# Patient Record
Sex: MALE | Race: WHITE | NOT HISPANIC OR LATINO | Employment: FULL TIME | URBAN - METROPOLITAN AREA
[De-identification: names, ages, dates, MRNs, and addresses within clinical notes are randomized per-mention and may not be internally consistent; named-entity substitution may affect disease eponyms.]

---

## 2022-12-20 NOTE — TELEPHONE ENCOUNTER
Action 2022 JTV 10:45 AM    Action Taken CSS called patient. Patient did not . [facility] LVM for patient to return call.      Action 2022 JTV 4:14 PM    Action Taken Patient returned call and confirmed he will get his [place] records emailed over. Patient also confirmed he has a lab appointment scheduled.      Action 2022 JTV 9:22 AM    Action Taken CSS sent a message to Joslyn to confirm cost center number for translating patient records.     @10:24 AM, [facility] sent a request for patient records to be translated.     Action 2023 JTV 8:09am   Action Taken Clinic Supervisor responded back stating we will not have records translated.         MEDICAL RECORDS REQUEST   [facility]  Urology Clinic  [address] [place] [address]  PHONE: [phone]  Fax: [phone]        FUTURE VISIT INFORMATION                                                   Willi Lares, : 1995 scheduled for future visit at [facility]    APPOINTMENT INFORMATION:    Date: 2023    Provider:  Nahun Hartmann MD    Reason for Visit/Diagnosis: infertility    RECORDS REQUESTED FOR VISIT                                                     NOTES  STATUS/DETAILS   MEDICATION LIST  no   INFERTILITY     FSH  Yes, Yes, scheduled for 2023   LAST UROLOGY/OB GYN VISIT NOTE  Yes, Yes, scheduled for 2023   SEMEN ANALYSIS (LAST 2)  Yes, scheduled for 2023   T  Yes, Yes, scheduled for 2023     PRE-VISIT CHECKLIST      Record collection complete yes   Appointment appropriately scheduled           (right time/right provider) Yes   Joint diagnostic appointment coordinated correctly          (ensure right order & amount of time) Yes   MyChart activation Yes   Questionnaire complete If no, please explain pending

## 2023-01-21 ENCOUNTER — LAB (OUTPATIENT)
Dept: LAB | Facility: CLINIC | Age: 28
End: 2023-01-21
Payer: COMMERCIAL

## 2023-01-21 DIAGNOSIS — Z31.41 FERTILITY TESTING: ICD-10-CM

## 2023-01-21 LAB — FSH SERPL IRP2-ACNC: 2.7 MIU/ML (ref 1.5–12.4)

## 2023-01-21 PROCEDURE — 84403 ASSAY OF TOTAL TESTOSTERONE: CPT

## 2023-01-21 PROCEDURE — 82670 ASSAY OF TOTAL ESTRADIOL: CPT

## 2023-01-21 PROCEDURE — 84270 ASSAY OF SEX HORMONE GLOBUL: CPT

## 2023-01-21 PROCEDURE — 36415 COLL VENOUS BLD VENIPUNCTURE: CPT

## 2023-01-21 PROCEDURE — 83001 ASSAY OF GONADOTROPIN (FSH): CPT

## 2023-01-23 LAB — SHBG SERPL-SCNC: 53 NMOL/L (ref 11–80)

## 2023-01-25 LAB
TESTOST FREE SERPL-MCNC: 6.16 NG/DL
TESTOST SERPL-MCNC: 413 NG/DL (ref 240–950)

## 2023-01-28 LAB — ESTRADIOL SERPL HS-MCNC: 17 PG/ML (ref 10–40)

## 2023-01-29 NOTE — RESULT ENCOUNTER NOTE
Dimas Márquez     Here are your recent test results which are NORMAL.  There are no concerns.      Thank You,    Please let me know if you have any questions!    Anne JOHNSON

## 2023-02-06 ENCOUNTER — MYC MEDICAL ADVICE (OUTPATIENT)
Dept: UROLOGY | Facility: CLINIC | Age: 28
End: 2023-02-06

## 2023-02-06 ENCOUNTER — LAB (OUTPATIENT)
Dept: LAB | Facility: CLINIC | Age: 28
End: 2023-02-06
Payer: COMMERCIAL

## 2023-02-06 DIAGNOSIS — Z31.41 FERTILITY TESTING: Primary | ICD-10-CM

## 2023-02-06 DIAGNOSIS — Z31.41 ENCOUNTER FOR SPERM COUNT FOR FERTILITY TESTING: ICD-10-CM

## 2023-02-06 LAB
ABSTINENCE DAYS: 4 DAYS (ref 2–7)
AGGLUTINATION: ABNORMAL
ANALYSIS TEMP - CENTIGRADE: 23 CENTIGRADE
COLLECTION METHOD: ABNORMAL
COLLECTION SITE: ABNORMAL
CONSENT TO RELEASE TO PARTNER: YES
DAL- RECEIVED TIME: ABNORMAL
IMMOTILE: 0 %
LIQUEFIED: YES
NON-PROGRESSIVE MOTILITY: 0 %
PROGRESSIVE MOTILITY: 0 %
ROUND CELLS: 0 MILLION/ML
SPECIMEN PH: 6 PH
SPECIMEN VOLUME: 1 ML
SPERM CONCENTRATION: 0 MILLION/ML
TIME OF ANALYSIS: ABNORMAL
TOTAL PROGRESSIVE MOTILE NUMBER: 0 MILLION
TOTAL SPERM NUMBER: 0 MILLION
VISCOUS: NO

## 2023-02-06 PROCEDURE — 89310 SEMEN ANALYSIS W/COUNT: CPT

## 2023-02-09 ENCOUNTER — OFFICE VISIT (OUTPATIENT)
Dept: UROLOGY | Facility: CLINIC | Age: 28
End: 2023-02-09
Payer: COMMERCIAL

## 2023-02-09 ENCOUNTER — PRE VISIT (OUTPATIENT)
Dept: UROLOGY | Facility: CLINIC | Age: 28
End: 2023-02-09

## 2023-02-09 VITALS
WEIGHT: 155 LBS | HEIGHT: 72 IN | OXYGEN SATURATION: 97 % | SYSTOLIC BLOOD PRESSURE: 168 MMHG | HEART RATE: 85 BPM | BODY MASS INDEX: 20.99 KG/M2 | DIASTOLIC BLOOD PRESSURE: 74 MMHG

## 2023-02-09 DIAGNOSIS — N46.01 AZOOSPERMIA: Primary | ICD-10-CM

## 2023-02-09 DIAGNOSIS — Z87.438 HISTORY OF UNDESCENDED TESTICLE: ICD-10-CM

## 2023-02-09 DIAGNOSIS — R86.8 LOW VOLUME OF EJACULATED SEMEN: ICD-10-CM

## 2023-02-09 PROCEDURE — 99204 OFFICE O/P NEW MOD 45 MIN: CPT | Performed by: UROLOGY

## 2023-02-09 ASSESSMENT — PAIN SCALES - GENERAL: PAINLEVEL: NO PAIN (0)

## 2023-02-09 NOTE — PROGRESS NOTES
It was my pleasure to see Mr. Willi Lares, a 28 year old male here in consultation today for fertility evaluation.  His spouse is age 29    This couple has been attempting to conceive for the last 2 yrs.  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  They have no previous pregnancy together.  Pregnancies with other partners: none.  They have tried timed intercourse. They have not tried IUI or IVF.    Female factors suspected: None known.  Cycles are fairly irregular.  She is getting seen by OB for this.  PCOS suspected for her.  Took metformin and was able to menstruate.      He has semen analysis results from [place]:    Sept 2020- 6d abstinence, 0.5mL  4 immotile dysmorphic sperms. Elevated WBCs seen.  Oct 2020  0.5mL, 7 sperm seen, no motility, 6/7 dysmorphic.  100K WBC.  Nov 2020 2d abst  0.2mL, 19 sperm, no motility.  Most dysmorphic.  500k WBC.    Semen analysis results [facility] showed azoospermia 2/6/23    Hormone labs [facility] Aug 3, 2020   T 467  FSH 2.17   LH 4.15      Component      Latest Ref Rng & Units 1/21/2023   Free Testosterone Calculated      ng/dL 6.16   Testosterone Total      240 - 950 ng/dL 413   Estradiol Ultrasensitive      10 - 40 pg/mL 17   FSH      1.5 - 12.4 mIU/mL 2.7   Sex Hormone Binding Globulin      11 - 80 nmol/L 53     UA [place]- post ejac urine did not show sperm.      PAST MEDICAL HISTORY / PAST SURG HISTORY  History of twin birth, he was small twin.   Hernia at birth ? Inguinal hernia   History of undescended testis not sure which side.  ? Right       Medications as of 2/9/2023:  No prescription medications   No history of testosterone replacement therapy.    ALLERGY:   No Known Allergies    SOCIAL HISTORY:  . Occupation: .  No alcohol abuse, no tobacco use.   Social History     Tobacco Use     Smoking status: Never     Smokeless tobacco: Never   No marijuana      REVIEW OF SYSTEMS:  Significant for  feeling well at present .    Denies erectile dysfunction, ejaculatory problems, testicular pain. No vision or smell deficits, no chronic sinus or respiratory infections. No recent febrile illness, weight loss. No heat or cold intolerance, gynecomastia, or other endocrine complaints.    Otherwise, no constitutional, eye, ENT, heart, lung, GI , musculoskeletal, skin, neurologic, psychiatric, or hematologic complaints.    GONADOTOXIN EXPOSURE: Unremarkable. Otherwise negative for marijuana, heat, chemicals, pesticides, heavy metals, steroids, chemotherapy or radiation.    GENERAL PHYSICAL EXAM  BP (!) 168/74 (BP Location: Right arm, Patient Position: Sitting, Cuff Size: Adult Regular)   Pulse 85   Ht 1.829 m (6')   Wt 70.3 kg (155 lb)   SpO2 97%   BMI 21.02 kg/m       Constitutional: No acute distress. Well nourished.   PSYCH: normal mood and affect.  NEURO: normal gait, no focal deficits.   EYES: anicteric, EOMI, PERR  CARDIOPULMONARY: breathing non-labored, pulse regular, no peripheral edema.  GI: Abdomen soft, non-tender, no obvious surgical scars, no organomegaly.  MUSCULOSKELETAL: normal limb proportions, no muscle wasting, no contractures.  SKIN: Normal virilized hair distribution, no lesions, warts or rashes over genitalia, abdomen extremities or face.  HEME/LYMPH: no ecchymosis, no lymphadenopathy in groin, no lymphedema.     EXAM:  Phallus circumcised, meatus adequate, no plaques palpated.   Left testis descended , size is 18 cc , consistency is normal . No intra-testicular masses.   Right testis descended , size is 18 cc , consistency is normal . No intra-testicular masses.   Epididymes present, non-tender, not enlarged.   Left cord: Vas present. No varicocele noted.  Right cord: Vas present. No varicocele noted.     Rectal exam deferred.     Labs/imaging reviewed by me today: see HPI    ASSESSMENT:    Fertility Testing.    Testicular hypofunction - azoospermia.  Unclear if obstruction or  non-obstructive azoospermia     No varicocele noted    Low volume acidic pH semen.  Consistent with absent or obstructed SV's  Vasa are both palpable.    History of undescended testis, not sure what side.      PLAN:    Hormonal panels reviewed - all normal.    Semen analyses x 4 reviewed. Currently shows azoospermia.    Recommended pelvic MRI or trans-rectal ultrasound to evaluate for ejaculatory duct obstruction and presence of SV's.  He needs to arrange insurance to consider imaging.    obtain genetic screen with karyotype and Y-chromosome microdeletion, and would definitely consider CF screening also.    financial liaison to contact.    Thank-you for allowing me to care for your patient.  Sincerely,    Nahun Hartmann MD        Additional Coding Information:    Problems:  4 -- one undiagnosed new problem with uncertain prognosis    Data Reviewed  3 or more studies reviewed, as listed above     Tests ordered/pending: karyotype, Y-chromosome microdeletion, CF screen, pelvic MRI.    Notes from other providers reviewed: N/A     Level of risk:  3 -- low risk (e.g., OTC medication or observation, minor surgery without risks)    Time spent:  40 minutes spent on the date of the encounter doing chart review, history and exam, documentation and further activities per the note

## 2023-02-09 NOTE — NURSING NOTE
Chief Complaint   Patient presents with     Consult     Infertility       Blood pressure (!) 168/74, pulse 85, height 1.829 m (6'), weight 70.3 kg (155 lb), SpO2 97 %. Body mass index is 21.02 kg/m .    There is no problem list on file for this patient.      No Known Allergies    No current outpatient medications on file.       Social History     Tobacco Use     Smoking status: Never     Smokeless tobacco: Never       Lloyd Yao, EMT  2/9/2023  10:03 AM

## 2023-02-09 NOTE — LETTER
2/9/2023       RE: Willi Lares  [address] [facility] [address]     Dear Colleague,    Thank you for referring your patient, Willi Lares, to the [facility]OLOGY [facility] [place] at [facility]. Please see a copy of my visit note below.    It was my pleasure to see . Willi Lares, a 28 year old male here in consultation today for fertility evaluation.  His spouse is age 29    This couple has been attempting to conceive for the last 2 yrs.  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  They have no previous pregnancy together.  Pregnancies with other partners: none.  They have tried timed intercourse. They have not tried IUI or IVF.    Female factors suspected: None known.  Cycles are fairly irregular.  She is getting seen by OB for this.  PCOS suspected for her.  Took metformin and was able to menstruate.      He has semen analysis results from [place]:    Sept 2020- 6d abstinence, 0.5mL  4 immotile dysmorphic sperms. Elevated WBCs seen.  Oct 2020  0.5mL, 7 sperm seen, no motility, 6/7 dysmorphic.  100K WBC.  Nov 2020 2d abst  0.2mL, 19 sperm, no motility.  Most dysmorphic.  500k WBC.    Semen analysis results [facility] showed azoospermia 2/6/23    Hormone labs [facility] Aug 3, 2020   T 467  FSH 2.17   LH 4.15      Component      Latest Ref Rng & Units 1/21/2023   Free Testosterone Calculated      ng/dL 6.16   Testosterone Total      240 - 950 ng/dL 413   Estradiol Ultrasensitive      10 - 40 pg/mL 17   FSH      1.5 - 12.4 mIU/mL 2.7   Sex Hormone Binding Globulin      11 - 80 nmol/L 53     UA [place]- post ejac urine did not show sperm.      PAST MEDICAL HISTORY / PAST SURG HISTORY  History of twin birth, he was small twin.   Hernia at birth ? Inguinal hernia   History of undescended testis not sure which side.  ? Right       Medications as of 2/9/2023:  No prescription medications   No  history of testosterone replacement therapy.    ALLERGY:   No Known Allergies    SOCIAL HISTORY:  . Occupation: lab tech.  No alcohol abuse, no tobacco use.   Social History     Tobacco Use     Smoking status: Never     Smokeless tobacco: Never   No marijuana      REVIEW OF SYSTEMS:  Significant for feeling well at present .    Denies erectile dysfunction, ejaculatory problems, testicular pain. No vision or smell deficits, no chronic sinus or respiratory infections. No recent febrile illness, weight loss. No heat or cold intolerance, gynecomastia, or other endocrine complaints.    Otherwise, no constitutional, eye, ENT, heart, lung, GI , musculoskeletal, skin, neurologic, psychiatric, or hematologic complaints.    GONADOTOXIN EXPOSURE: Unremarkable. Otherwise negative for marijuana, heat, chemicals, pesticides, heavy metals, steroids, chemotherapy or radiation.    GENERAL PHYSICAL EXAM  BP (!) 168/74 (BP Location: Right arm, Patient Position: Sitting, Cuff Size: Adult Regular)   Pulse 85   Ht 1.829 m (6')   Wt 70.3 kg (155 lb)   SpO2 97%   BMI 21.02 kg/m       Constitutional: No acute distress. Well nourished.   PSYCH: normal mood and affect.  NEURO: normal gait, no focal deficits.   EYES: anicteric, EOMI, PERR  CARDIOPULMONARY: breathing non-labored, pulse regular, no peripheral edema.  GI: Abdomen soft, non-tender, no obvious surgical scars, no organomegaly.  MUSCULOSKELETAL: normal limb proportions, no muscle wasting, no contractures.  SKIN: Normal virilized hair distribution, no lesions, warts or rashes over genitalia, abdomen extremities or face.  HEME/LYMPH: no ecchymosis, no lymphadenopathy in groin, no lymphedema.     EXAM:  Phallus circumcised, meatus adequate, no plaques palpated.   Left testis descended , size is 18 cc , consistency is normal . No intra-testicular masses.   Right testis descended , size is 18 cc , consistency is normal . No intra-testicular masses.   Epididymes present,  non-tender, not enlarged.   Left cord: Vas present. No varicocele noted.  Right cord: Vas present. No varicocele noted.     Rectal exam deferred.     Labs/imaging reviewed by me today: see HPI    ASSESSMENT:    Fertility Testing.    Testicular hypofunction - azoospermia.  Unclear if obstruction or non-obstructive azoospermia     No varicocele noted    Low volume acidic pH semen.  Consistent with absent or obstructed SV's  Vasa are both palpable.    History of undescended testis, not sure what side.      PLAN:    Hormonal panels reviewed - all normal.    Semen analyses x 4 reviewed. Currently shows azoospermia.    Recommended pelvic MRI or trans-rectal ultrasound to evaluate for ejaculatory duct obstruction and presence of SV's.  He needs to arrange insurance to consider imaging.    obtain genetic screen with karyotype and Y-chromosome microdeletion, and would definitely consider CF screening also.    financial liaison to contact.    Thank-you for allowing me to care for your patient.  Sincerely,    Nahun Hartmann MD        Additional Coding Information:    Problems:  4 -- one undiagnosed new problem with uncertain prognosis    Data Reviewed  3 or more studies reviewed, as listed above     Tests ordered/pending: karyotype, Y-chromosome microdeletion, CF screen, pelvic MRI.    Notes from other providers reviewed: N/A     Level of risk:  3 -- low risk (e.g., OTC medication or observation, minor surgery without risks)    Time spent:  40 minutes spent on the date of the encounter doing chart review, history and exam, documentation and further activities per the note               Again, thank you for allowing me to participate in the care of your patient.      Sincerely,    Nahun Hartmann MD

## 2023-02-09 NOTE — LETTER
Date:February 10, 2023      Provider requested that no letter be sent. Do not send.       [facility]

## 2023-02-12 ENCOUNTER — HEALTH MAINTENANCE LETTER (OUTPATIENT)
Age: 28
End: 2023-02-12

## 2024-03-10 ENCOUNTER — HEALTH MAINTENANCE LETTER (OUTPATIENT)
Age: 29
End: 2024-03-10

## 2024-07-24 ENCOUNTER — TRANSFERRED RECORDS (OUTPATIENT)
Dept: HEALTH INFORMATION MANAGEMENT | Facility: CLINIC | Age: 29
End: 2024-07-24

## 2024-07-24 ENCOUNTER — LAB (OUTPATIENT)
Dept: LAB | Facility: CLINIC | Age: 29
End: 2024-07-24
Payer: COMMERCIAL

## 2024-07-24 DIAGNOSIS — N46.01 AZOOSPERMIA: Primary | ICD-10-CM

## 2024-07-24 DIAGNOSIS — R86.8 LOW VOLUME OF EJACULATED SEMEN: ICD-10-CM

## 2024-07-24 DIAGNOSIS — Z87.438 HISTORY OF UNDESCENDED TESTICLE: ICD-10-CM

## 2024-07-24 PROCEDURE — 99000 SPECIMEN HANDLING OFFICE-LAB: CPT

## 2024-07-24 PROCEDURE — 36415 COLL VENOUS BLD VENIPUNCTURE: CPT

## 2024-08-05 LAB — SCANNED LAB RESULT: NORMAL

## 2024-08-06 NOTE — RESULT ENCOUNTER NOTE
Dear Jerzy,     Here are your recent results.     Your Y-chromosome microdeletion test was normal.  I will contact you when I see the karyotype results, those are still pending.    Please let us know if you have any questions or concerns.     Anne JOHNSON

## 2024-08-09 ENCOUNTER — HOSPITAL ENCOUNTER (OUTPATIENT)
Dept: MRI IMAGING | Facility: CLINIC | Age: 29
Discharge: HOME | End: 2024-08-09
Attending: UROLOGY | Admitting: UROLOGY
Payer: COMMERCIAL

## 2024-08-09 DIAGNOSIS — N46.01 AZOOSPERMIA: ICD-10-CM

## 2024-08-09 DIAGNOSIS — R86.8 LOW VOLUME OF EJACULATED SEMEN: ICD-10-CM

## 2024-08-09 DIAGNOSIS — Z87.438 HISTORY OF UNDESCENDED TESTICLE: ICD-10-CM

## 2024-08-09 PROCEDURE — 72195 MRI PELVIS W/O DYE: CPT

## 2024-08-13 LAB — SCANNED LAB RESULT: NORMAL

## 2024-08-14 NOTE — RESULT ENCOUNTER NOTE
Dear Jerzy,     Here are your recent results.     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  This is potentially fixable with surgery.  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    Options are looking like either a surgery to try to open the sperm duct blockage in the prostate, or the other option is likely going to be surgical sperm acquisition and IVF.       I am optimistic that unblocking the ejaculatory duct obstruction would be successful for you.  It's nice to find an anatomic cause for low sperm count, these are not super common to find.  (Although, there is still a chance that surgery would not restore sperm to the semen.)    I do see you had a normal karyotype result with Sandeep Hernandez PA-c on 7/22/24.    Please let us know if you have any questions.  I am happy to see you back to discuss surgical options, please call to schedule a follow-up appointment [phone].    Anne JOHNSON

## 2024-08-16 ENCOUNTER — PRE VISIT (OUTPATIENT)
Dept: UROLOGY | Facility: CLINIC | Age: 29
End: 2024-08-16
Payer: COMMERCIAL

## 2024-08-16 NOTE — TELEPHONE ENCOUNTER
Reason for visit: infertility     Relevant information: Azoospermia, hx of undescended testis, low volume of ejaculated semen    Records/imaging/labs/orders: all records available    Pt called: no need for a call    At Rooming:  Standard rooming      Gage Claros  8/16/2024  12:24 PM

## 2024-09-26 ENCOUNTER — APPOINTMENT (OUTPATIENT)
Dept: UROLOGY | Facility: CLINIC | Age: 29
End: 2024-09-26
Payer: COMMERCIAL

## 2024-09-26 ENCOUNTER — OFFICE VISIT (OUTPATIENT)
Dept: UROLOGY | Facility: CLINIC | Age: 29
End: 2024-09-26
Payer: COMMERCIAL

## 2024-09-26 VITALS
HEART RATE: 72 BPM | DIASTOLIC BLOOD PRESSURE: 89 MMHG | BODY MASS INDEX: 21.4 KG/M2 | WEIGHT: 158 LBS | SYSTOLIC BLOOD PRESSURE: 135 MMHG | HEIGHT: 72 IN | OXYGEN SATURATION: 96 %

## 2024-09-26 DIAGNOSIS — N46.01 AZOOSPERMIA: Primary | ICD-10-CM

## 2024-09-26 DIAGNOSIS — Q55.4: ICD-10-CM

## 2024-09-26 DIAGNOSIS — Z31.84 ENCOUNTER FOR FERTILITY PRESERVATION PROCEDURE: ICD-10-CM

## 2024-09-26 PROCEDURE — 99215 OFFICE O/P EST HI 40 MIN: CPT | Performed by: UROLOGY

## 2024-09-26 PROCEDURE — 99207 PR NO CHARGE NURSE ONLY: CPT

## 2024-09-26 RX ORDER — CEFAZOLIN SODIUM 2 G/50ML
2 SOLUTION INTRAVENOUS
OUTPATIENT
Start: 2024-09-26

## 2024-09-26 RX ORDER — CEFAZOLIN SODIUM 2 G/50ML
2 SOLUTION INTRAVENOUS SEE ADMIN INSTRUCTIONS
OUTPATIENT
Start: 2024-09-26

## 2024-09-26 RX ORDER — ACETAMINOPHEN 325 MG/1
975 TABLET ORAL ONCE
OUTPATIENT
Start: 2024-09-26 | End: 2024-09-26

## 2024-09-26 RX ORDER — ACETAMINOPHEN 650 MG/1
650 SUPPOSITORY RECTAL ONCE
OUTPATIENT
Start: 2024-09-26

## 2024-09-26 ASSESSMENT — PAIN SCALES - GENERAL: PAINLEVEL: NO PAIN (0)

## 2024-09-26 NOTE — LETTER
9/26/2024       RE: Willi Lares  [address] [facility] [address]     Dear Colleague,    Thank you for referring your patient, Willi Lares, to the [facility] [place] at [facility]. Please see a copy of my visit note below.    HPI:  Willi Lares is a 29 year old male being seen for follow-up infertility     He was last seen 2/9/2023.  Since that time he moved out of [facility] for a year, and now is back in [place] and wanting to pursue fertility workup.    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    He has several semen analyses with low volume ejaculate.    He has normal Y chromosome and karyotype testing.    Negative CF testing  Post ejaculate urine did not show any sperm  Prostate MRI shows bilateral dilated SV's, with the right seminal vesicle dilated down into the prostate.    History of undescended testis, he is not certain which side.      Exam:  /89   Pulse 72   Ht 1.829 m (6')   Wt 71.7 kg (158 lb)   SpO2 96%   BMI 21.43 kg/m      General: Alert, oriented, nad  Eyes: anicteric, EOMI.  Pulse: regular  Resps: normal, non-labored.  Abdomen:  nondistended.   exam deferred.       Review of imaging and Labs:    He has semen analysis results from [place]:     Sept 2020- 6d abstinence, 0.5mL  4 immotile dysmorphic sperms. Elevated WBCs seen.  Oct 2020  0.5mL, 7 sperm seen, no motility, 6/7 dysmorphic.  100K WBC.  Nov 2020 2d abst  0.2mL, 19 sperm, no motility.  Most dysmorphic.  500k WBC.     Semen analysis results [facility] showed azoospermia 2/6/23     Hormone labs [facility] Aug 3, 2020   T 467  FSH 2.17   LH 4.15        Component      Latest Ref Rng & Units 1/21/2023   Free Testosterone Calculated      ng/dL 6.16   Testosterone Total      240 - 950 ng/dL 413   Estradiol Ultrasensitive      10 - 40 pg/mL 17    FSH      1.5 - 12.4 mIU/mL 2.7   Sex Hormone Binding Globulin      11 - 80 nmol/L 53      UA [place]- post ejac urine did not show sperm.      Karyotype 46,XY 7/22/24  Y-chromosome microdeletion test normal 7/22/24  CF testing negative 7/22/24 including 5T negative.    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 The etiology of azoospermia appears obstructive.         ASSESSMENT:  Fertility Testing.  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    Normal karyotype and Y-chromosome microdeletion tests.  CF screen was negative.  No varicocele noted  Low volume acidic pH semen.  Consistent with absent or obstructed SV's  Vasa are both palpable.  History of undescended testis, not sure what side.  Prostate MRI suggests ejaculatory duct obstruction.  Hormonal panels reviewed - all normal.  Semen analyses x 4 reviewed. Currently shows azoospermia.    We discussed that all signs point towards obstructive azoospermia.  He does have a history of undescended testicle which puts him at increased risk for decree spermatogenesis, but his FSH is not elevated, and all signs point more so towards obstruction.  I discussed with him that it is relatively rare to find apparent ejaculatory duct obstruction causing low volume ejaculate, but that definitely does appear to be his case.    We discussed that a good next step would be to set up a procedure under anesthesia.  This would involve transrectal ultrasound, injection of methylene blue or preferably indigo carmine into the seminal vesicles via ultrasound guidance, and performing a transurethral resection of the ejaculatory ducts.  I discussed that opening the ejaculatory ducts not return sperm to the semen.  If this is a longstanding congenital obstruction as it appears, he may have secondary obstruction(s) in  bilateral epididymis.  In the past, his semen analysis has shown scant sperm in the semen, so at least 1 side is in continuity, previously.      We discussed the option of testicle sperm extraction while under anesthesia, he would like to pursue this.  I discussed with him that any sperm we get directly from the testicle would necessitate in-vitro fertilization to use it for fertility.  He will think about it, but he is pretty sure he would like to have surgical sperm acquisition while under anesthesia.     PLAN:  Schedule surgery for: TURED, AMILCAR possible TESE, trans-rectal ultrasound procedure.  ID labs today.  AMILCAR/TESE cryopreservation order for sperm cryo      Nahun Hartmann MD  [facility] [place]    ==========================  Additional Coding Information:    Problems:  3 -- one stable chronic illness    Data Reviewed  3 or more studies reviewed, as listed above     Tests ordered: 6 labs ordered     Level of risk:  3 -- low risk (e.g., OTC medication or observation, minor surgery without risks)    Time spent:  44 minutes spent on the date of the encounter doing chart review, history and exam, documentation and further activities as noted above              Again, thank you for allowing me to participate in the care of your patient.      Sincerely,    Nahun Hartmann MD

## 2024-09-26 NOTE — PROGRESS NOTES
HPI:  Willi Lares is a 29 year old male being seen for follow-up infertility     He was last seen 2/9/2023.  Since that time he moved out of [facility] for a year, and now is back in [place] and wanting to pursue fertility workup.    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    He has several semen analyses with low volume ejaculate.    He has normal Y chromosome and karyotype testing.    Negative CF testing  Post ejaculate urine did not show any sperm  Prostate MRI shows bilateral dilated SV's, with the right seminal vesicle dilated down into the prostate.    History of undescended testis, he is not certain which side.      Exam:  /89   Pulse 72   Ht 1.829 m (6')   Wt 71.7 kg (158 lb)   SpO2 96%   BMI 21.43 kg/m      General: Alert, oriented, nad  Eyes: anicteric, EOMI.  Pulse: regular  Resps: normal, non-labored.  Abdomen:  nondistended.   exam deferred.       Review of imaging and Labs:    He has semen analysis results from [facility]:     Sept 2020- 6d abstinence, 0.5mL  4 immotile dysmorphic sperms. Elevated WBCs seen.  Oct 2020  0.5mL, 7 sperm seen, no motility, 6/7 dysmorphic.  100K WBC.  Nov 2020 2d abst  0.2mL, 19 sperm, no motility.  Most dysmorphic.  500k WBC.     Semen analysis results [facility] showed azoospermia 2/6/23     Hormone labs [facility] Aug 3, 2020   T 467  FSH 2.17   LH 4.15        Component      Latest Ref Rng & Units 1/21/2023   Free Testosterone Calculated      ng/dL 6.16   Testosterone Total      240 - 950 ng/dL 413   Estradiol Ultrasensitive      10 - 40 pg/mL 17   FSH      1.5 - 12.4 mIU/mL 2.7   Sex Hormone Binding Globulin      11 - 80 nmol/L 53      UA [facility]- post ejac urine did not show sperm.      Karyotype 46,XY 7/22/24  Y-chromosome microdeletion test normal 7/22/24  CF testing negative 7/22/24 including 5T negative.    Prostate MRI done 8/9/24 suggests  ejaculatory duct obstruction  IMPRESSION: Distention of the bilateral seminal vesicles with a  transition at a midline prostatic cyst, utricle vs. mullerian duct  cyst. The etiology of azoospermia appears obstructive.         ASSESSMENT:  Fertility Testing.  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    Normal karyotype and Y-chromosome microdeletion tests.  CF screen was negative.  No varicocele noted  Low volume acidic pH semen.  Consistent with absent or obstructed SV's  Vasa are both palpable.  History of undescended testis, not sure what side.  Prostate MRI suggests ejaculatory duct obstruction.  Hormonal panels reviewed - all normal.  Semen analyses x 4 reviewed. Currently shows azoospermia.    We discussed that all signs point towards obstructive azoospermia.  He does have a history of undescended testicle which puts him at increased risk for decree spermatogenesis, but his FSH is not elevated, and all signs point more so towards obstruction.  I discussed with him that it is relatively rare to find apparent ejaculatory duct obstruction causing low volume ejaculate, but that definitely does appear to be his case.    We discussed that a good next step would be to set up a procedure under anesthesia.  This would involve transrectal ultrasound, injection of methylene blue or preferably indigo carmine into the seminal vesicles via ultrasound guidance, and performing a transurethral resection of the ejaculatory ducts.  I discussed that opening the ejaculatory ducts not return sperm to the semen.  If this is a longstanding congenital obstruction as it appears, he may have secondary obstruction(s) in bilateral epididymis.  In the past, his semen analysis has shown scant sperm in the semen, so at least 1 side is in continuity, previously.      We discussed the option of testicle sperm extraction while under anesthesia, he would like to pursue this.  I discussed with him that any sperm we get directly  from the testicle would necessitate in-vitro fertilization to use it for fertility.  He will think about it, but he is pretty sure he would like to have surgical sperm acquisition while under anesthesia.     PLAN:  Schedule surgery for: TURED, AMILCAR possible TESE, trans-rectal ultrasound procedure.  ID labs today.  AMILCAR/TESE cryopreservation order for sperm cryo      Nahun Hartmann MD  [facility] [place]    ==========================  Additional Coding Information:    Problems:  3 -- one stable chronic illness    Data Reviewed  3 or more studies reviewed, as listed above     Tests ordered: 6 labs ordered     Level of risk:  3 -- low risk (e.g., OTC medication or observation, minor surgery without risks)    Time spent:  44 minutes spent on the date of the encounter doing chart review, history and exam, documentation and further activities as noted above

## 2024-09-26 NOTE — NURSING NOTE
Chief Complaint   Patient presents with    Consult     Pt states is here for follow up as well as next steps for infertility       Blood pressure 135/89, pulse 72, height 1.829 m (6'), weight 71.7 kg (158 lb), SpO2 96%. Body mass index is 21.43 kg/m .    There is no problem list on file for this patient.      No Known Allergies    No current outpatient medications on file.       Social History     Tobacco Use    Smoking status: Never    Smokeless tobacco: Rolly Claros  9/26/2024  2:47 PM

## 2024-09-30 ENCOUNTER — TELEPHONE (OUTPATIENT)
Dept: UROLOGY | Facility: CLINIC | Age: 29
End: 2024-09-30
Payer: COMMERCIAL

## 2024-09-30 NOTE — TELEPHONE ENCOUNTER
Called and LVM for patient to schedule surgery with Dr. Hartmann. Provided direct call back number [phone].      Daly Fiore on 9/30/2024 at 9:49 AM

## 2024-10-01 NOTE — TELEPHONE ENCOUNTER
Called patient and offered next opening 1/3/25. Patient states he was told we could get him in before the end of the year as his deductible was met.     Message sent to clinic to see if they would like to cancel a clinic to operate on a Friday at [facility].    Patient understands writer will call back once we have an update from the clinic.    Daly Fiore on 10/1/2024 at 4:27 PM

## 2024-10-03 PROBLEM — Q55.4: Status: ACTIVE | Noted: 2024-09-26

## 2024-10-03 PROBLEM — N46.01 AZOOSPERMIA: Status: ACTIVE | Noted: 2024-09-26

## 2024-10-03 NOTE — TELEPHONE ENCOUNTER
Called patient to schedule surgery with Dr. Hartmann    Spoke with:  Patient    Date of Surgery: 11/15/24    Arrival time Discussed with Patient:  Yes but advised it may change    Location of surgery: [facility]     Pre-Op H&P: With PCP Dr. Hernandez within 30 days of the surgery date    Post-Op Appts: 12/5/24 at 1:20 PM      Discussed with patient pre-op RN will call 2-3 days prior to surgery with arrival time and instructions:  Yes     Packet sent out: Yes  via [employer] Message [DATE] 10/3/24    Additional Comments:  Patient does not want to do the genetic testing of his sperm and would like that removed from the procedure. Will route to provider.      All patients questions were answered and patient was instructed to review surgical packet and call back with any questions or concerns.       Daly Fiore on 10/3/2024 at 3:55 PM

## 2024-10-04 ENCOUNTER — PREP FOR PROCEDURE (OUTPATIENT)
Dept: UROLOGY | Facility: CLINIC | Age: 29
End: 2024-10-04
Payer: COMMERCIAL

## 2024-10-23 NOTE — PROGRESS NOTES
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   Teaching Topic:  Pre and post op teaching  Person Involved in teaching: Yes    Motivation Level:  Asks Questions: Yes  Eager to Learn: Yes  Cooperative: Yes  Receptive (willing/able to accept information):  Yes    Patient demonstrates understanding of the following:  Date of surgery:  will  call  Location of surgery:  [facility] - 5th Floor  History and Physical and any other testing necessary prior to surgery: Yes  Required time line for completion of History and Physical and any pre-op testing: Yes    Patient demonstrates understanding of the following:  Pre-op bowel prep:  N/A  Pre-op showering/scrub information with PCMX Soap: Yes  Blood thinner medications discussed and when to stop (if applicable):  N/A  Discussed no visitor's at this time due to increase Covid-19 cases and how we need to make sure everyone stays safe.    Infection Prevention:   Patient demonstrates understanding of the following:  Surgical procedure site care taught: Yes  Signs and symptoms of infection taught: Yes      Post-op follow-up:  Discussed how to contact the hospital, nurse, and clinic scheduling staff if necessary. (See packet information)    Instructional materials used/given/mailed:  [facility] Packet, post op teaching sheet, Map, Soap, and with the arrival/location information to come closer to the surgery date.    Surgical instructions packet given to patient in office:  N/A    Follow up: Discussed arranging for someone to drive you home. ( No public transportation)  Someone needed  to stay the first twenty hours after surgery: Yes     referral: not needed     home:  spouse    Care Giver:  spouse    PCP:  Sandeep Carranza, RN, BSN, PHN  Care Coordinator Urology  [facility]  Urology [facility]  [phone]

## 2024-10-30 LAB — SCANNED LAB RESULT: NORMAL

## 2024-11-05 ENCOUNTER — PRE VISIT (OUTPATIENT)
Dept: UROLOGY | Facility: CLINIC | Age: 29
End: 2024-11-05
Payer: COMMERCIAL

## 2024-11-05 NOTE — TELEPHONE ENCOUNTER
Reason for visit: Post op (DOS 11/15)     Relevant information: Cystourethroscopy done on 11/15/24    Records/imaging/labs/orders: All records available    At Rooming:  Standard rooming      Gage Claros  11/5/2024  4:30 PM

## 2024-11-14 ENCOUNTER — ANESTHESIA EVENT (OUTPATIENT)
Dept: SURGERY | Facility: AMBULATORY SURGERY CENTER | Age: 29
End: 2024-11-14
Payer: COMMERCIAL

## 2024-11-15 ENCOUNTER — ANESTHESIA (OUTPATIENT)
Dept: SURGERY | Facility: AMBULATORY SURGERY CENTER | Age: 29
End: 2024-11-15
Payer: COMMERCIAL

## 2024-11-15 ENCOUNTER — HOSPITAL ENCOUNTER (OUTPATIENT)
Facility: AMBULATORY SURGERY CENTER | Age: 29
Discharge: HOME | End: 2024-11-15
Attending: UROLOGY
Payer: COMMERCIAL

## 2024-11-15 VITALS
TEMPERATURE: 97 F | OXYGEN SATURATION: 98 % | HEART RATE: 61 BPM | SYSTOLIC BLOOD PRESSURE: 108 MMHG | BODY MASS INDEX: 21.4 KG/M2 | RESPIRATION RATE: 14 BRPM | HEIGHT: 72 IN | DIASTOLIC BLOOD PRESSURE: 71 MMHG | WEIGHT: 158 LBS

## 2024-11-15 DIAGNOSIS — Q55.4: ICD-10-CM

## 2024-11-15 DIAGNOSIS — Z31.84 ENCOUNTER FOR FERTILITY PRESERVATION PROCEDURE: ICD-10-CM

## 2024-11-15 DIAGNOSIS — N46.01 AZOOSPERMIA: ICD-10-CM

## 2024-11-15 PROCEDURE — 88305 TISSUE EXAM BY PATHOLOGIST: CPT | Mod: TC | Performed by: UROLOGY

## 2024-11-15 PROCEDURE — 88305 TISSUE EXAM BY PATHOLOGIST: CPT | Mod: 26 | Performed by: PATHOLOGY

## 2024-11-15 RX ORDER — PROPOFOL 10 MG/ML
INJECTION, EMULSION INTRAVENOUS PRN
Status: DISCONTINUED | OUTPATIENT
Start: 2024-11-15 | End: 2024-11-15

## 2024-11-15 RX ORDER — LIDOCAINE HYDROCHLORIDE 20 MG/ML
INJECTION, SOLUTION INFILTRATION; PERINEURAL PRN
Status: DISCONTINUED | OUTPATIENT
Start: 2024-11-15 | End: 2024-11-15

## 2024-11-15 RX ORDER — ACETAMINOPHEN 650 MG/1
650 SUPPOSITORY RECTAL ONCE
Status: COMPLETED | OUTPATIENT
Start: 2024-11-15 | End: 2024-11-15

## 2024-11-15 RX ORDER — ONDANSETRON 2 MG/ML
4 INJECTION INTRAMUSCULAR; INTRAVENOUS EVERY 30 MIN PRN
Status: DISCONTINUED | OUTPATIENT
Start: 2024-11-15 | End: 2024-11-15 | Stop reason: HOSPADM

## 2024-11-15 RX ORDER — CEFAZOLIN SODIUM 2 G/50ML
2 SOLUTION INTRAVENOUS
Status: COMPLETED | OUTPATIENT
Start: 2024-11-15 | End: 2024-11-15

## 2024-11-15 RX ORDER — OXYCODONE HYDROCHLORIDE 5 MG/1
5 TABLET ORAL
Status: DISCONTINUED | OUTPATIENT
Start: 2024-11-15 | End: 2024-11-16 | Stop reason: HOSPADM

## 2024-11-15 RX ORDER — DEXAMETHASONE SODIUM PHOSPHATE 4 MG/ML
INJECTION, SOLUTION INTRA-ARTICULAR; INTRALESIONAL; INTRAMUSCULAR; INTRAVENOUS; SOFT TISSUE PRN
Status: DISCONTINUED | OUTPATIENT
Start: 2024-11-15 | End: 2024-11-15

## 2024-11-15 RX ORDER — ONDANSETRON 2 MG/ML
4 INJECTION INTRAMUSCULAR; INTRAVENOUS EVERY 30 MIN PRN
Status: DISCONTINUED | OUTPATIENT
Start: 2024-11-15 | End: 2024-11-16 | Stop reason: HOSPADM

## 2024-11-15 RX ORDER — FENTANYL CITRATE 50 UG/ML
25 INJECTION, SOLUTION INTRAMUSCULAR; INTRAVENOUS EVERY 5 MIN PRN
Status: DISCONTINUED | OUTPATIENT
Start: 2024-11-15 | End: 2024-11-15 | Stop reason: HOSPADM

## 2024-11-15 RX ORDER — FENTANYL CITRATE 50 UG/ML
INJECTION, SOLUTION INTRAMUSCULAR; INTRAVENOUS PRN
Status: DISCONTINUED | OUTPATIENT
Start: 2024-11-15 | End: 2024-11-15

## 2024-11-15 RX ORDER — OXYCODONE HYDROCHLORIDE 5 MG/1
10 TABLET ORAL
Status: DISCONTINUED | OUTPATIENT
Start: 2024-11-15 | End: 2024-11-16 | Stop reason: HOSPADM

## 2024-11-15 RX ORDER — KETOROLAC TROMETHAMINE 30 MG/ML
15 INJECTION, SOLUTION INTRAMUSCULAR; INTRAVENOUS
Status: DISCONTINUED | OUTPATIENT
Start: 2024-11-15 | End: 2024-11-15 | Stop reason: HOSPADM

## 2024-11-15 RX ORDER — CEFAZOLIN SODIUM 2 G/50ML
2 SOLUTION INTRAVENOUS SEE ADMIN INSTRUCTIONS
Status: DISCONTINUED | OUTPATIENT
Start: 2024-11-15 | End: 2024-11-15 | Stop reason: HOSPADM

## 2024-11-15 RX ORDER — HYDROMORPHONE HYDROCHLORIDE 1 MG/ML
0.2 INJECTION, SOLUTION INTRAMUSCULAR; INTRAVENOUS; SUBCUTANEOUS EVERY 5 MIN PRN
Status: DISCONTINUED | OUTPATIENT
Start: 2024-11-15 | End: 2024-11-15 | Stop reason: HOSPADM

## 2024-11-15 RX ORDER — ONDANSETRON 4 MG/1
4 TABLET, ORALLY DISINTEGRATING ORAL EVERY 30 MIN PRN
Status: DISCONTINUED | OUTPATIENT
Start: 2024-11-15 | End: 2024-11-16 | Stop reason: HOSPADM

## 2024-11-15 RX ORDER — SODIUM CHLORIDE, SODIUM LACTATE, POTASSIUM CHLORIDE, CALCIUM CHLORIDE 600; 310; 30; 20 MG/100ML; MG/100ML; MG/100ML; MG/100ML
INJECTION, SOLUTION INTRAVENOUS CONTINUOUS PRN
Status: DISCONTINUED | OUTPATIENT
Start: 2024-11-15 | End: 2024-11-15

## 2024-11-15 RX ORDER — ACETAMINOPHEN 325 MG/1
975 TABLET ORAL ONCE
Status: COMPLETED | OUTPATIENT
Start: 2024-11-15 | End: 2024-11-15

## 2024-11-15 RX ORDER — HYDROMORPHONE HYDROCHLORIDE 1 MG/ML
0.4 INJECTION, SOLUTION INTRAMUSCULAR; INTRAVENOUS; SUBCUTANEOUS EVERY 5 MIN PRN
Status: DISCONTINUED | OUTPATIENT
Start: 2024-11-15 | End: 2024-11-15 | Stop reason: HOSPADM

## 2024-11-15 RX ORDER — ONDANSETRON 4 MG/1
4 TABLET, ORALLY DISINTEGRATING ORAL EVERY 30 MIN PRN
Status: DISCONTINUED | OUTPATIENT
Start: 2024-11-15 | End: 2024-11-15 | Stop reason: HOSPADM

## 2024-11-15 RX ORDER — NALOXONE HYDROCHLORIDE 0.4 MG/ML
0.1 INJECTION, SOLUTION INTRAMUSCULAR; INTRAVENOUS; SUBCUTANEOUS
Status: DISCONTINUED | OUTPATIENT
Start: 2024-11-15 | End: 2024-11-15 | Stop reason: HOSPADM

## 2024-11-15 RX ORDER — ONDANSETRON 2 MG/ML
INJECTION INTRAMUSCULAR; INTRAVENOUS PRN
Status: DISCONTINUED | OUTPATIENT
Start: 2024-11-15 | End: 2024-11-15

## 2024-11-15 RX ORDER — DEXAMETHASONE SODIUM PHOSPHATE 10 MG/ML
4 INJECTION, SOLUTION INTRAMUSCULAR; INTRAVENOUS
Status: DISCONTINUED | OUTPATIENT
Start: 2024-11-15 | End: 2024-11-16 | Stop reason: HOSPADM

## 2024-11-15 RX ORDER — KETOROLAC TROMETHAMINE 30 MG/ML
INJECTION, SOLUTION INTRAMUSCULAR; INTRAVENOUS PRN
Status: DISCONTINUED | OUTPATIENT
Start: 2024-11-15 | End: 2024-11-15

## 2024-11-15 RX ORDER — FENTANYL CITRATE 50 UG/ML
50 INJECTION, SOLUTION INTRAMUSCULAR; INTRAVENOUS EVERY 5 MIN PRN
Status: DISCONTINUED | OUTPATIENT
Start: 2024-11-15 | End: 2024-11-15 | Stop reason: HOSPADM

## 2024-11-15 RX ORDER — DIMENHYDRINATE 50 MG/ML
25 INJECTION, SOLUTION INTRAMUSCULAR; INTRAVENOUS
Status: DISCONTINUED | OUTPATIENT
Start: 2024-11-15 | End: 2024-11-15 | Stop reason: HOSPADM

## 2024-11-15 RX ORDER — NALOXONE HYDROCHLORIDE 0.4 MG/ML
0.1 INJECTION, SOLUTION INTRAMUSCULAR; INTRAVENOUS; SUBCUTANEOUS
Status: DISCONTINUED | OUTPATIENT
Start: 2024-11-15 | End: 2024-11-16 | Stop reason: HOSPADM

## 2024-11-15 RX ORDER — PROPOFOL 10 MG/ML
INJECTION, EMULSION INTRAVENOUS CONTINUOUS PRN
Status: DISCONTINUED | OUTPATIENT
Start: 2024-11-15 | End: 2024-11-15

## 2024-11-15 RX ORDER — DEXAMETHASONE SODIUM PHOSPHATE 10 MG/ML
4 INJECTION, SOLUTION INTRAMUSCULAR; INTRAVENOUS
Status: DISCONTINUED | OUTPATIENT
Start: 2024-11-15 | End: 2024-11-15 | Stop reason: HOSPADM

## 2024-11-15 RX ORDER — LIDOCAINE 40 MG/G
CREAM TOPICAL
Status: DISCONTINUED | OUTPATIENT
Start: 2024-11-15 | End: 2024-11-15 | Stop reason: HOSPADM

## 2024-11-15 RX ADMIN — CEFAZOLIN SODIUM 2 G: 2 SOLUTION INTRAVENOUS at 07:26

## 2024-11-15 RX ADMIN — ONDANSETRON 4 MG: 2 INJECTION INTRAMUSCULAR; INTRAVENOUS at 07:46

## 2024-11-15 RX ADMIN — LIDOCAINE HYDROCHLORIDE 80 MG: 20 INJECTION, SOLUTION INFILTRATION; PERINEURAL at 07:32

## 2024-11-15 RX ADMIN — FENTANYL CITRATE 50 MCG: 50 INJECTION, SOLUTION INTRAMUSCULAR; INTRAVENOUS at 07:51

## 2024-11-15 RX ADMIN — PROPOFOL 200 MG: 10 INJECTION, EMULSION INTRAVENOUS at 07:34

## 2024-11-15 RX ADMIN — PROPOFOL 150 MCG/KG/MIN: 10 INJECTION, EMULSION INTRAVENOUS at 07:35

## 2024-11-15 RX ADMIN — FENTANYL CITRATE 50 MCG: 50 INJECTION, SOLUTION INTRAMUSCULAR; INTRAVENOUS at 07:32

## 2024-11-15 RX ADMIN — KETOROLAC TROMETHAMINE 30 MG: 30 INJECTION, SOLUTION INTRAMUSCULAR; INTRAVENOUS at 08:26

## 2024-11-15 RX ADMIN — DEXAMETHASONE SODIUM PHOSPHATE 4 MG: 4 INJECTION, SOLUTION INTRA-ARTICULAR; INTRALESIONAL; INTRAMUSCULAR; INTRAVENOUS; SOFT TISSUE at 07:46

## 2024-11-15 RX ADMIN — SODIUM CHLORIDE, SODIUM LACTATE, POTASSIUM CHLORIDE, CALCIUM CHLORIDE: 600; 310; 30; 20 INJECTION, SOLUTION INTRAVENOUS at 07:29

## 2024-11-15 ASSESSMENT — LIFESTYLE VARIABLES: TOBACCO_USE: 0

## 2024-11-15 NOTE — PROGRESS NOTES
Pt declines TESE portion of procedure today.  Consent adjusted to remove the testicular sperm acquisition portion.

## 2024-11-15 NOTE — ANESTHESIA CARE TRANSFER NOTE
Patient: Willi Lares    Procedure: Procedure(s):  CYSTOURETHROSCOPY, WITH TRANSURETHRAL RESECTION OF  EJACULATORY DUCTS BILATERAL  transrectal ultrasound, inject seminal vesicle with methylene blue.       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    Anesthesia Type:   General     Note:    Oropharynx: spontaneously breathing and oropharynx clear of all foreign objects  Level of Consciousness: awake  Oxygen Supplementation: face mask  Level of Supplemental Oxygen (L/min / FiO2): 6  Independent Airway: airway patency satisfactory and stable  Dentition: dentition unchanged  Vital Signs Stable: post-procedure vital signs reviewed and stable  Report to RN Given: handoff report given  Patient transferred to: PACU  Comments: Resps easy and regular. Report to PACU RN  Handoff Report: Identifed the Patient, Identified the Reponsible Provider, Reviewed the pertinent medical history, Discussed the surgical course, Reviewed Intra-OP anesthesia mangement and issues during anesthesia, Set expectations for post-procedure period and Allowed opportunity for questions and acknowledgement of understanding    Vitals:  Vitals Value Taken Time   /64 11/15/24 0845   Temp 36.9  C (98.5  F) 11/15/24 0832   Pulse 68 11/15/24 0850   Resp 9 11/15/24 0850   SpO2 98 % 11/15/24 0850   Vitals shown include unfiled device data.    Electronically Signed By: NANCY LE CRNA  November 15, 2024  8:51 AM

## 2024-11-15 NOTE — OP NOTE
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]    Post-operative diagnosis:  Same as pre-operative diagnosis     Procedure:   1) CYSTOURETHROSCOPY, WITH TRANSURETHRAL RESECTION OF  EJACULATORY DUCTS - bilateral   2) TRANSRECTAL ULTRASOUND, INJECTION OF RIGHT SEMINAL VESICLE WITH METHYLENE BLUE    Surgeon:              * Nahun Hartmann MD - Primary  Resident: Sabra Isidro MD   Anesthesia:     General             Estimated Blood Loss: 2 ml      Drains: None     Specimens:          ID Type Source Tests Collected by Time Destination   1 : Prostate chips Tissue Prostate SURGICAL PATHOLOGY EXAM Nahun Hartmann MD 11/15/2024  8:18 AM        Findings: Right ejaculatory duct cyst      Complications:            None.    INDICATIONS FOR PROCEDURE: Willi Lares is a 29 year old male who was seen in consultation for infertility. Work-up showed suspected obstructive azospermia and pelvic MRI showed congenital atresia of the ejaculatory duct(s).  After discussion of the risks, benefits and alternatives of the procedure, the patient agreed to proceed with the above procedure.  We originally discussed testicular sperm acquisition to be done during the same procedure, but he opted out of this procedure and declined AMILCAR/TESE today.    DESCRIPTION OF PROCEDURE: After verifying informed consent, the patient was taken to the operating room. Anesthesia was induced and he was placed in dorsal lithotomy position; he was prepped and draped in standard sterile fashion. A timeout was performed to verify correct patient and procedure. Perioperative antibiotics were in place before the procedure commenced. We began the procedure by inserting transrectal ultrasound probe to identify the ejaculatory ducts and cyst.     The trans-rectal ultrasound probe was inserted and the prostate examined with biplanar ultrasound.  Capsule: Distinct   SVs:  right dilated  into mid prostate, 1cm dimeter of intraprostatic right ejaculatory duct obstruction.  Left ejaculatory duct obstruction not identified.  Left seminal vesicle did not appear to be fluid density, appeared decompressed, not full of fluid density as the right was.   Calcifications: None  The right vas deferens and ejaculatory duct cyst were injected with 2 ml of dilute methylene blue.     We then inserted the 26F resectoscope and survey of the bladder demonstrated bilateral ureteral orifices in orthotopic position, no tumors, suspicious mucosal change, diverticula, or bladder stones were noted.  The meatus was noted to be very mildly hypospadic in appearance but not in position (thinner ventral mucosa).  The bladder contained some blue dye we had injected, and a pinpoint blue dot was noted within the prostatic urethra at the right verumontanum ( consistent with ejaculatory duct).       A 26-Ugandan monopolar resectoscope was used to resect the verumontanum.  Great care was taken to preserve the sphincter.  No resection was taken past the apex of the veru. The right ejaculatory duct was immediately identified just proximal to the veru after one tiny bit of resection- blue dye visible. The site of resection was guided by simultaneous trans-rectal ultrasound.  One more small resection proximal to this site widely unroofed the blue-containing right ejaculatory duct, which was widely patent into the prostatic urethra, consistent with the dilated right ejaculatory duct obstruction seen on prostate MRI. The opening to the cyst appeared widely patent after resection. After emptying the bladder, we sparingly cauterized the resected area as necessary to ensure excellent hemostasis.     The pt was awakened from anesthesia and brought to the PACU in stable condition.   The small bit of prostate tissue was sent as a specimen.    POSTOPERATIVE PLAN:   - Semen analysis and follow-up with Dr. Hartmann in 4 weeks.       I was present and  scrubbed for the entire procedure.  Nahun Hartmann MD  Urology Staff

## 2024-11-15 NOTE — BRIEF OP NOTE
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]  Post-operative diagnosis Same as pre-operative diagnosis    Procedure: CYSTOURETHROSCOPY, WITH TRANSURETHRAL RESECTION OF  EJACULATORY DUCTS BILATERAL, Bilateral - Urethra  transrectal ultrasound, inject seminal vesicle with methylene blue., N/A - Rectum    Surgeon: Surgeons and Role:     * Nahun Hartmann MD - Primary  Resident: Sabra Isidro MD   Anesthesia: General   Estimated Blood Loss: 2 ml     Drains: None    Specimens:     ID Type Source Tests Collected by Time Destination   1 : Prostate chips Tissue Prostate SURGICAL PATHOLOGY EXAM Nahun Hartmann MD 11/15/2024  8:18 AM      Findings: Right ejaculatory duct cyst     Complications: None.

## 2024-11-15 NOTE — ANESTHESIA PROCEDURE NOTES
Airway       Patient location during procedure: OR  Staff -        CRNA: Joslyn Davison APRN CRNA       Performed By: CRNAIndications and Patient Condition       Indications for airway management: umair-procedural       Induction type:intravenous       Mask difficulty assessment: 1 - vent by mask    Final Airway Details       Final airway type: supraglottic airway    Supraglottic Airway Details        Type: LMA       Brand: LMA Unique       LMA size: 5    Post intubation assessment        Placement verified by: capnometry and chest rise        Number of attempts at approach: 1       Number of other approaches attempted: 0       Secured with: tape       Ease of procedure: easy       Dentition: Intact and Unchanged

## 2024-11-15 NOTE — ANESTHESIA PREPROCEDURE EVALUATION
"Anesthesia Pre-Procedure Evaluation    Patient: Willi Lares   MRN: [id number] : 1995        Procedure : Procedure(s):  CYSTOURETHROSCOPY, WITH TRANSURETHRAL RESECTION OF  EJACULATORY DUCTS BILATERAL  cosmetic testis sperm aspiration, transrectal ultrasound, inject seminal vesicle with indigo carmine or methylene blue.          No past medical history on file.   History reviewed. No pertinent surgical history.   No Known Allergies   Social History     Tobacco Use    Smoking status: Never    Smokeless tobacco: Never   Substance Use Topics    Alcohol use: Not on file      Wt Readings from Last 1 Encounters:   11/15/24 71.7 kg (158 lb)        Anesthesia Evaluation   Pt has not had prior anesthetic         ROS/MED HX  ENT/Pulmonary:  - neg pulmonary ROS  (-) tobacco use, asthma, sleep apnea and REUBEN risk factors   Neurologic:       Cardiovascular:       METS/Exercise Tolerance: >4 METS    Hematologic:  - neg hematologic  ROS     Musculoskeletal:  - neg musculoskeletal ROS     GI/Hepatic:  - neg GI/hepatic ROS  (-) GERD   Renal/Genitourinary: Comment: Atresia of ejaculatory duct      Endo:  - neg endo ROS     Psychiatric/Substance Use:       Infectious Disease:  - neg infectious disease ROS     Malignancy:       Other:            Physical Exam    Airway        Mallampati: II   TM distance: > 3 FB   Neck ROM: full   Mouth opening: > 3 cm    Respiratory Devices and Support         Dental       (+) Completely normal teeth      Cardiovascular   cardiovascular exam normal          Pulmonary   pulmonary exam normal                OUTSIDE LABS:  CBC: No results found for: \"WBC\", \"HGB\", \"HCT\", \"PLT\"  BMP: No results found for: \"NA\", \"POTASSIUM\", \"CHLORIDE\", \"CO2\", \"BUN\", \"CR\", \"GLC\"  COAGS: No results found for: \"PTT\", \"INR\", \"FIBR\"  POC: No results found for: \"BGM\", \"HCG\", \"HCGS\"  HEPATIC: No results found for: \"ALBUMIN\", \"PROTTOTAL\", \"ALT\", \"AST\", \"GGT\", \"ALKPHOS\", \"BILITOTAL\", \"BILIDIRECT\", \"VIVIENNE\"  OTHER: No results " "found for: \"PH\", \"LACT\", \"A1C\", \"LIN\", \"PHOS\", \"MAG\", \"LIPASE\", \"AMYLASE\", \"TSH\", \"T4\", \"T3\", \"CRP\", \"SED\"    Anesthesia Plan    ASA Status:  1       Anesthesia Type: General.     - Airway: LMA   Induction: Intravenous.   Maintenance: Balanced.        Consents    Anesthesia Plan(s) and associated risks, benefits, and realistic alternatives discussed. Questions answered and patient/representative(s) expressed understanding.     - Discussed: Risks, Benefits and Alternatives for BOTH SEDATION and the PROCEDURE were discussed     - Discussed with:  Patient            Postoperative Care    Pain management: IV analgesics, Oral pain medications, Multi-modal analgesia.   PONV prophylaxis: Ondansetron (or other 5HT-3)     Comments:               Cathi Mcgee MD    I have reviewed the pertinent notes and labs in the chart from the past 30 days and (re)examined the patient.  Any updates or changes from those notes are reflected in this note.                                   "

## 2024-11-15 NOTE — DISCHARGE INSTRUCTIONS
[facility]  Home Care Following Anesthesia  For 24 hours after surgery:  Get plenty of rest.  A responsible adult must stay with you for at least 24 hours after you leave the surgery center.  Do not drive or use heavy equipment.  If you have weakness or tingling, don't drive or use heavy equipment until this feeling goes away.   Do not drink alcohol.   Avoid strenuous or risky activities.  Ask for help when climbing stairs.  You may feel lightheaded.  IF so, sit for a few minutes before standing.  Have someone help you get up.   If you have nausea (feel sick to your stomach): Drink only clear liquids such as apple juice, ginger ale, broth or 7-Up.  Rest may also help.  Be sure to drink enough fluids.  Move to a regular diet as you feel able.   You may have a slight fever.  Call the doctor if your fever is over 100 F (37.7 C) (taken under the tongue) or lasts longer than 24 hours.  You may have a dry mouth, a sore throat, muscle aches or trouble sleeping. These should go away after 24 hours.  Do not make important or legal decisions.   It is recommended to avoid smoking.               Tips for taking pain medications  To get the best pain relief possible, remember these points:  Take pain medications as directed, before pain becomes severe.  Pain medication can upset your stomach: taking it with food may help.  Constipation is a common side effect of pain medication. Drink plenty of  fluids.  Eat foods high in fiber. Take a stool softener if recommended by your doctor or pharmacist.  Do not drink alcohol, drive or operate machinery while taking pain medications.  Ask about other ways to control pain, such as with heat, ice or relaxation.  Toradol 30 mg given at  0830.  Do not take any NSAIDs for 6 hours.  OK after 2:30pm.  (Ibuprofen, Advil, Motrin, Naproxen, Aleve).      Tylenol/Acetaminophen Consumption    If you feel your pain relief is insufficient, you may take  Tylenol/Acetaminophen in addition to your narcotic pain medication.   Be careful not to exceed 4,000 mg of Tylenol/Acetaminophen in a 24 hour period from all sources.  If you are taking extra strength Tylenol/acetaminophen (500 mg), the maximum dose is 8 tablets in 24 hours.  If you are taking regular strength acetaminophen (325 mg), the maximum dose is 12 tablets in 24 hours.    Call a doctor for any of the following:  Signs of infection (fever, growing tenderness at the surgery site, a large amount of drainage or bleeding, severe pain, foul-smelling drainage, redness, swelling).  It has been over 8 to 10 hours since surgery and you are still not able to urinate (pass water).  Headache for over 24 hours.  Signs of Covid-19 infection (temperature over 100 degrees, shortness of breath, cough, loss of taste/smell, generalized body aches, persistent headache, chills, sore throat, nausea/vomiting/diarrhea)  Your doctor is:  Dr. Nahun Hartmann, Prostate and Urology: [phone]                    Or dial [phone] and ask for the resident on call for:  Prostate Urology  For emergency care, call the:  [place] [facility]:  [phone] (TTY for hearing impaired: [phone])

## 2024-11-15 NOTE — ANESTHESIA POSTPROCEDURE EVALUATION
Patient: Willi Lares    Procedure: Procedure(s):  CYSTOURETHROSCOPY, WITH TRANSURETHRAL RESECTION OF  EJACULATORY DUCTS BILATERAL  transrectal ultrasound, inject seminal vesicle with methylene blue.       Anesthesia Type:  General    Note:  Disposition: Outpatient   Postop Pain Control: Uneventful            Sign Out: Well controlled pain   PONV: No   Neuro/Psych: Uneventful            Sign Out: Acceptable/Baseline neuro status   Airway/Respiratory: Uneventful            Sign Out: Acceptable/Baseline resp. status   CV/Hemodynamics: Uneventful            Sign Out: Acceptable CV status; No obvious hypovolemia; No obvious fluid overload   Other NRE: NONE   DID A NON-ROUTINE EVENT OCCUR? No           Last vitals:  Vitals Value Taken Time   /64 11/15/24 0845   Temp 36.5  C (97.7  F) 11/15/24 0900   Pulse 65 11/15/24 0900   Resp 14 11/15/24 0900   SpO2 95 % 11/15/24 0900   Vitals shown include unfiled device data.    Electronically Signed By: Cathi Mcgee MD  November 15, 2024  11:36 AM   none

## 2024-11-19 LAB
PATH REPORT.COMMENTS IMP SPEC: NORMAL
PATH REPORT.COMMENTS IMP SPEC: NORMAL
PATH REPORT.FINAL DX SPEC: NORMAL
PATH REPORT.GROSS SPEC: NORMAL
PATH REPORT.MICROSCOPIC SPEC OTHER STN: NORMAL
PATH REPORT.RELEVANT HX SPEC: NORMAL
PHOTO IMAGE: NORMAL

## 2024-11-20 NOTE — RESULT ENCOUNTER NOTE
Dear Jerzy,     Here are your recent results.     The tiny bit of prostate tissue resected was benign, as suspected.    Please let us know if you have any questions or concerns.    Thank You!  Anne JOHNSON

## 2024-11-27 ENCOUNTER — PRE VISIT (OUTPATIENT)
Dept: UROLOGY | Facility: CLINIC | Age: 29
End: 2024-11-27
Payer: COMMERCIAL

## 2024-11-27 NOTE — TELEPHONE ENCOUNTER
Reason for visit: Post op     Relevant information: cystourethroscopy with transurethral resection of ejaculatory ducts     Records/imaging/labs/orders: All records available    At Rooming:  Standard rooming      Gage Claros  11/27/2024  12:09 PM

## 2024-12-10 ENCOUNTER — LAB (OUTPATIENT)
Dept: LAB | Facility: CLINIC | Age: 29
End: 2024-12-10
Payer: COMMERCIAL

## 2024-12-10 DIAGNOSIS — N46.01 AZOOSPERMIA: ICD-10-CM

## 2024-12-10 LAB
SEMEN ANALYSIS P VAS PNL: NORMAL
SPERM MOTILE SMN QL MICRO: NORMAL

## 2024-12-10 PROCEDURE — 89321 SEMEN ANAL SPERM DETECTION: CPT

## 2025-01-07 ENCOUNTER — LAB (OUTPATIENT)
Dept: LAB | Facility: CLINIC | Age: 30
End: 2025-01-07
Payer: COMMERCIAL

## 2025-01-07 DIAGNOSIS — R86.8 LOW VOLUME OF EJACULATED SEMEN: ICD-10-CM

## 2025-01-07 DIAGNOSIS — Z87.438 HISTORY OF UNDESCENDED TESTICLE: ICD-10-CM

## 2025-01-07 DIAGNOSIS — Q55.4: Primary | ICD-10-CM

## 2025-01-07 LAB
ABSTINENCE DAYS: 6 DAYS (ref 2–7)
AGGLUTINATION: NO
ANALYSIS TEMP - CENTIGRADE: 21 CENTIGRADE
COLLECTION METHOD: ABNORMAL
COLLECTION SITE: ABNORMAL
CONSENT TO RELEASE TO PARTNER: YES
DAL- RECEIVED TIME: ABNORMAL
IMMOTILE: 0 %
LIQUEFIED: YES
NON-PROGRESSIVE MOTILITY: 0 %
PROGRESSIVE MOTILITY: 0 %
ROUND CELLS: 0 MILLION/ML
SPECIMEN PH: 6 PH
SPECIMEN VOLUME: 1 ML
SPERM CONCENTRATION: 0 MILLION/ML
TIME OF ANALYSIS: ABNORMAL
TOTAL PROGRESSIVE MOTILE NUMBER: 0 MILLION
TOTAL SPERM NUMBER: 0 MILLION
VISCOUS: NO

## 2025-01-07 PROCEDURE — 89260 SPERM ISOLATION SIMPLE: CPT

## 2025-01-11 NOTE — RESULT ENCOUNTER NOTE
Dear Jerzy,     Here are your recent results.     Your recent semen analysis from this week just shows scant sperm.  I was hoping for a more normal sperm count!    I think the ejaculatory duct obstruction has been relieved, as evidenced by you noting a significantly increased semen volume.  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  A testicle biopsy would confirm this (if there is normal sperm production in the testicle, but little/no sperm in the semen, this confirms obstruction).  The other possibility is there truly is decreased sperm production happening in the testicles.  I think this case is less likely, however.    We have a few options going forward:     1. It is very reasonable to continue observation.  There are some sperm in the semen.  The best case scenario is perhaps with the prostate obstruction relieved, the epididymis will start to drain better and we may see an increased number of live sperm in the semen.    2. If the semen continues to show very low numbers of sperm, a testicle biopsy would be the next step.  This would be combined with sperm freezing, if you are IVF candidates.  If this testicle biopsy does confirm only low-level sperm production, then the option for fertility would be to freeze sperm and pursue in vitro fertilization.      3. However, if the testicle biopsy showed perfectly normal sperm production, IVF would still be an option, but a second option would be to perform exploratory surgery of the scrotum to see if the obstructed sites in the epididymis can be identified and bypassed with a microscopic surgery called vasoepididymostomy.  I would estimate this option would have at best 50-50 chance of returning better sperm counts into the semen, however.    Surgical sperm acquisition and  in-vitro fertilization are expensive (and may not be covered by health insurance), but this plan would very likely be the quickest route to a pregnancy.    Let me know if you would like to set up an appointment to discuss options in depth.    Please let us know if you have any questions,    Thank You,      Anne JOHNSON

## 2025-03-08 ENCOUNTER — APPOINTMENT (OUTPATIENT)
Dept: CT IMAGING | Facility: CLINIC | Age: 30
End: 2025-03-08
Attending: EMERGENCY MEDICINE
Payer: COMMERCIAL

## 2025-03-08 ENCOUNTER — HOSPITAL ENCOUNTER (INPATIENT)
Facility: CLINIC | Age: 30
End: 2025-03-08
Attending: EMERGENCY MEDICINE | Admitting: INTERNAL MEDICINE
Payer: COMMERCIAL

## 2025-03-08 ENCOUNTER — APPOINTMENT (OUTPATIENT)
Dept: ULTRASOUND IMAGING | Facility: CLINIC | Age: 30
End: 2025-03-08
Attending: EMERGENCY MEDICINE
Payer: COMMERCIAL

## 2025-03-08 DIAGNOSIS — K74.60 CIRRHOSIS OF LIVER WITH ASCITES, UNSPECIFIED HEPATIC CIRRHOSIS TYPE (H): ICD-10-CM

## 2025-03-08 DIAGNOSIS — K92.2 UPPER GI BLEED: ICD-10-CM

## 2025-03-08 DIAGNOSIS — R18.8 CIRRHOSIS OF LIVER WITH ASCITES, UNSPECIFIED HEPATIC CIRRHOSIS TYPE (H): ICD-10-CM

## 2025-03-08 LAB
ABO + RH BLD: NORMAL
ALBUMIN SERPL BCG-MCNC: 4 G/DL (ref 3.5–5.2)
ALP SERPL-CCNC: 47 U/L (ref 40–150)
ALT SERPL W P-5'-P-CCNC: 35 U/L (ref 0–70)
ANION GAP SERPL CALCULATED.3IONS-SCNC: 9 MMOL/L (ref 7–15)
AST SERPL W P-5'-P-CCNC: 32 U/L (ref 0–45)
BASOPHILS # BLD AUTO: 0 10E3/UL (ref 0–0.2)
BASOPHILS NFR BLD AUTO: 0 %
BILIRUB SERPL-MCNC: 1 MG/DL
BLD GP AB SCN SERPL QL: NEGATIVE
BUN SERPL-MCNC: 39.5 MG/DL (ref 6–20)
CALCIUM SERPL-MCNC: 8.3 MG/DL (ref 8.8–10.4)
CHLORIDE SERPL-SCNC: 107 MMOL/L (ref 98–107)
CREAT SERPL-MCNC: 0.74 MG/DL (ref 0.67–1.17)
EGFRCR SERPLBLD CKD-EPI 2021: >90 ML/MIN/1.73M2
EOSINOPHIL # BLD AUTO: 0.1 10E3/UL (ref 0–0.7)
EOSINOPHIL NFR BLD AUTO: 1 %
ERYTHROCYTE [DISTWIDTH] IN BLOOD BY AUTOMATED COUNT: 13.5 % (ref 10–15)
GLUCOSE SERPL-MCNC: 150 MG/DL (ref 70–99)
HCO3 SERPL-SCNC: 24 MMOL/L (ref 22–29)
HCT VFR BLD AUTO: 30.9 % (ref 40–53)
HCV AB SERPL QL IA: NONREACTIVE
HGB BLD-MCNC: 10.3 G/DL (ref 13.3–17.7)
HGB BLD-MCNC: 9.8 G/DL (ref 13.3–17.7)
HOLD SPECIMEN: NORMAL
IMM GRANULOCYTES # BLD: 0 10E3/UL
IMM GRANULOCYTES NFR BLD: 0 %
INR PPP: 1.26 (ref 0.85–1.15)
LYMPHOCYTES # BLD AUTO: 2.3 10E3/UL (ref 0.8–5.3)
LYMPHOCYTES NFR BLD AUTO: 25 %
MCH RBC QN AUTO: 29.9 PG (ref 26.5–33)
MCHC RBC AUTO-ENTMCNC: 33.3 G/DL (ref 31.5–36.5)
MCV RBC AUTO: 90 FL (ref 78–100)
MONOCYTES # BLD AUTO: 0.6 10E3/UL (ref 0–1.3)
MONOCYTES NFR BLD AUTO: 6 %
NEUTROPHILS # BLD AUTO: 6 10E3/UL (ref 1.6–8.3)
NEUTROPHILS NFR BLD AUTO: 67 %
NRBC # BLD AUTO: 0 10E3/UL
NRBC BLD AUTO-RTO: 0 /100
PLATELET # BLD AUTO: 151 10E3/UL (ref 150–450)
POTASSIUM SERPL-SCNC: 3.5 MMOL/L (ref 3.4–5.3)
PROT SERPL-MCNC: 6 G/DL (ref 6.4–8.3)
RBC # BLD AUTO: 3.44 10E6/UL (ref 4.4–5.9)
SODIUM SERPL-SCNC: 140 MMOL/L (ref 135–145)
SPECIMEN EXP DATE BLD: NORMAL
WBC # BLD AUTO: 8.9 10E3/UL (ref 4–11)

## 2025-03-08 PROCEDURE — 74174 CTA ABD&PLVS W/CONTRAST: CPT

## 2025-03-08 PROCEDURE — 85025 COMPLETE CBC W/AUTO DIFF WBC: CPT | Performed by: EMERGENCY MEDICINE

## 2025-03-08 PROCEDURE — 86923 COMPATIBILITY TEST ELECTRIC: CPT | Performed by: STUDENT IN AN ORGANIZED HEALTH CARE EDUCATION/TRAINING PROGRAM

## 2025-03-08 PROCEDURE — 86923 COMPATIBILITY TEST ELECTRIC: CPT | Performed by: INTERNAL MEDICINE

## 2025-03-08 PROCEDURE — 36415 COLL VENOUS BLD VENIPUNCTURE: CPT | Performed by: EMERGENCY MEDICINE

## 2025-03-08 PROCEDURE — 80053 COMPREHEN METABOLIC PANEL: CPT | Performed by: EMERGENCY MEDICINE

## 2025-03-08 PROCEDURE — 96375 TX/PRO/DX INJ NEW DRUG ADDON: CPT

## 2025-03-08 PROCEDURE — 86803 HEPATITIS C AB TEST: CPT | Performed by: INTERNAL MEDICINE

## 2025-03-08 PROCEDURE — 250N000011 HC RX IP 250 OP 636: Performed by: INTERNAL MEDICINE

## 2025-03-08 PROCEDURE — 250N000011 HC RX IP 250 OP 636: Performed by: EMERGENCY MEDICINE

## 2025-03-08 PROCEDURE — 258N000003 HC RX IP 258 OP 636: Performed by: INTERNAL MEDICINE

## 2025-03-08 PROCEDURE — 85610 PROTHROMBIN TIME: CPT | Performed by: EMERGENCY MEDICINE

## 2025-03-08 PROCEDURE — 99285 EMERGENCY DEPT VISIT HI MDM: CPT | Mod: 25

## 2025-03-08 PROCEDURE — 85014 HEMATOCRIT: CPT | Performed by: EMERGENCY MEDICINE

## 2025-03-08 PROCEDURE — 86900 BLOOD TYPING SEROLOGIC ABO: CPT | Performed by: EMERGENCY MEDICINE

## 2025-03-08 PROCEDURE — 93005 ELECTROCARDIOGRAM TRACING: CPT

## 2025-03-08 PROCEDURE — 99222 1ST HOSP IP/OBS MODERATE 55: CPT | Performed by: INTERNAL MEDICINE

## 2025-03-08 PROCEDURE — 250N000011 HC RX IP 250 OP 636: Mod: JA | Performed by: EMERGENCY MEDICINE

## 2025-03-08 PROCEDURE — 96374 THER/PROPH/DIAG INJ IV PUSH: CPT

## 2025-03-08 PROCEDURE — 120N000001 HC R&B MED SURG/OB

## 2025-03-08 PROCEDURE — 258N000003 HC RX IP 258 OP 636: Performed by: EMERGENCY MEDICINE

## 2025-03-08 PROCEDURE — 82040 ASSAY OF SERUM ALBUMIN: CPT | Performed by: EMERGENCY MEDICINE

## 2025-03-08 PROCEDURE — 85018 HEMOGLOBIN: CPT | Performed by: EMERGENCY MEDICINE

## 2025-03-08 PROCEDURE — 96376 TX/PRO/DX INJ SAME DRUG ADON: CPT

## 2025-03-08 PROCEDURE — 76705 ECHO EXAM OF ABDOMEN: CPT

## 2025-03-08 RX ORDER — IOPAMIDOL 755 MG/ML
83 INJECTION, SOLUTION INTRAVASCULAR ONCE
Status: COMPLETED | OUTPATIENT
Start: 2025-03-08 | End: 2025-03-08

## 2025-03-08 RX ORDER — AMOXICILLIN 250 MG
1 CAPSULE ORAL 2 TIMES DAILY PRN
Status: DISCONTINUED | OUTPATIENT
Start: 2025-03-08 | End: 2025-03-14 | Stop reason: HOSPADM

## 2025-03-08 RX ORDER — AMOXICILLIN 250 MG
2 CAPSULE ORAL 2 TIMES DAILY PRN
Status: DISCONTINUED | OUTPATIENT
Start: 2025-03-08 | End: 2025-03-14 | Stop reason: HOSPADM

## 2025-03-08 RX ORDER — CEFTRIAXONE 2 G/1
2 INJECTION, POWDER, FOR SOLUTION INTRAMUSCULAR; INTRAVENOUS EVERY 24 HOURS
Status: DISCONTINUED | OUTPATIENT
Start: 2025-03-08 | End: 2025-03-13

## 2025-03-08 RX ORDER — SODIUM CHLORIDE 9 MG/ML
INJECTION, SOLUTION INTRAVENOUS CONTINUOUS
Status: DISCONTINUED | OUTPATIENT
Start: 2025-03-08 | End: 2025-03-13

## 2025-03-08 RX ORDER — ONDANSETRON 2 MG/ML
4 INJECTION INTRAMUSCULAR; INTRAVENOUS
Status: DISCONTINUED | OUTPATIENT
Start: 2025-03-08 | End: 2025-03-14 | Stop reason: HOSPADM

## 2025-03-08 RX ORDER — OCTREOTIDE ACETATE 50 UG/ML
50 INJECTION, SOLUTION INTRAVENOUS; SUBCUTANEOUS ONCE
Status: COMPLETED | OUTPATIENT
Start: 2025-03-08 | End: 2025-03-08

## 2025-03-08 RX ORDER — CEFTRIAXONE 1 G/1
1 INJECTION, POWDER, FOR SOLUTION INTRAMUSCULAR; INTRAVENOUS ONCE
Status: COMPLETED | OUTPATIENT
Start: 2025-03-08 | End: 2025-03-08

## 2025-03-08 RX ORDER — CALCIUM CARBONATE 500 MG/1
1000 TABLET, CHEWABLE ORAL 4 TIMES DAILY PRN
Status: DISCONTINUED | OUTPATIENT
Start: 2025-03-08 | End: 2025-03-14 | Stop reason: HOSPADM

## 2025-03-08 RX ORDER — LIDOCAINE 40 MG/G
CREAM TOPICAL
Status: DISCONTINUED | OUTPATIENT
Start: 2025-03-08 | End: 2025-03-14 | Stop reason: HOSPADM

## 2025-03-08 RX ADMIN — OCTREOTIDE ACETATE 50 MCG/HR: 200 INJECTION, SOLUTION INTRAVENOUS; SUBCUTANEOUS at 16:56

## 2025-03-08 RX ADMIN — ONDANSETRON 4 MG: 2 INJECTION, SOLUTION INTRAMUSCULAR; INTRAVENOUS at 14:09

## 2025-03-08 RX ADMIN — IOPAMIDOL 83 ML: 755 INJECTION, SOLUTION INTRAVENOUS at 14:54

## 2025-03-08 RX ADMIN — PANTOPRAZOLE SODIUM 80 MG: 40 INJECTION, POWDER, LYOPHILIZED, FOR SOLUTION INTRAVENOUS at 14:08

## 2025-03-08 RX ADMIN — PROCHLORPERAZINE EDISYLATE 5 MG: 5 INJECTION INTRAMUSCULAR; INTRAVENOUS at 18:38

## 2025-03-08 RX ADMIN — SODIUM CHLORIDE: 0.9 INJECTION, SOLUTION INTRAVENOUS at 18:39

## 2025-03-08 RX ADMIN — PANTOPRAZOLE SODIUM 40 MG: 40 INJECTION, POWDER, FOR SOLUTION INTRAVENOUS at 20:09

## 2025-03-08 RX ADMIN — ONDANSETRON 4 MG: 2 INJECTION, SOLUTION INTRAMUSCULAR; INTRAVENOUS at 16:31

## 2025-03-08 RX ADMIN — OCTREOTIDE ACETATE 50 MCG: 50 INJECTION, SOLUTION INTRAVENOUS; SUBCUTANEOUS at 16:47

## 2025-03-08 RX ADMIN — CEFTRIAXONE 1 G: 1 INJECTION, POWDER, FOR SOLUTION INTRAMUSCULAR; INTRAVENOUS at 16:59

## 2025-03-08 ASSESSMENT — ACTIVITIES OF DAILY LIVING (ADL)
ADLS_ACUITY_SCORE: 41
ADLS_ACUITY_SCORE: 26
ADLS_ACUITY_SCORE: 26
ADLS_ACUITY_SCORE: 41
ADLS_ACUITY_SCORE: 26
ADLS_ACUITY_SCORE: 41

## 2025-03-08 ASSESSMENT — COLUMBIA-SUICIDE SEVERITY RATING SCALE - C-SSRS
2. HAVE YOU ACTUALLY HAD ANY THOUGHTS OF KILLING YOURSELF IN THE PAST MONTH?: NO
6. HAVE YOU EVER DONE ANYTHING, STARTED TO DO ANYTHING, OR PREPARED TO DO ANYTHING TO END YOUR LIFE?: NO
1. IN THE PAST MONTH, HAVE YOU WISHED YOU WERE DEAD OR WISHED YOU COULD GO TO SLEEP AND NOT WAKE UP?: NO

## 2025-03-08 NOTE — LETTER
[facility]  [address]  [facility] [address]  Phone: [phone]  Fax: [phone]    March 14, 2025        Willi VALDIVIA Luda  [address] [facility] [address]          To whom it may concern:    RE: Willi VALDIVIA Luda Márquez has been hospitalized under my care from 3/8/25 through 3/14/25. He can return to work on 3/31/25        Sincerely,      Guanako Dalal MD

## 2025-03-08 NOTE — H&P
[facility]    History and Physical - Hospitalist Service       Date of Admission:  3/8/2025    Assessment & Plan    Active Problems:    Upper GI bleed    Cirrhosis of liver with ascites, unspecified hepatic cirrhosis type (H)      Willi Lares is a 30 year old male admitted on 3/8/2025. He has medical history significant for azoospermia.  He presented to the ED with complaints of vomiting blood and also having dark watery bowel movements.     On presentation to the ED, CBC showed hemoglobin of 10.3 with repeat hemoglobin came down to 9.8.  CBC was otherwise unremarkable.  BMP showed BUN of 39.5 with normal creatinine of 0.74.  Calcium was low at 8.3.  Glucose was 150.  Protein was 6.0.  Otherwise CMP was unremarkable.  INR was 1.26.  CT angiogram abdomen pelvis showed no evidence of active GI bleed.  However, CT showed cirrhotic liver with features of portal hypertension including splenomegaly, small amount of ascites, and varices including extensive gastroesophageal varices.  CT also showed innumerable benign cyst throughout both kidneys.  CT showed bilateral nonobstructing intrarenal calculi measuring up to 4 mm on both sides.    # Upper GI bleeding: Patient presents with hematemesis.  No known history of liver disease.  No alcohol history.  No history of obesity.  Mother has history of polycystic kidney disease.  N.p.o.  IV fluids  Avoid NSAIDs  IV PPI  Avoid anticoagulation  Trend hemoglobin levels, transfuse for hemoglobin less than 7 or if hemodynamically unstable  Octreotide  Rocephin  GI consulted, will appreciate input  Cardiac monitoring    # Cirrhosis with ascites  Hepatitis C antibody   GI consulted, will appreciate input      # Polycystic kidney disease  # Nephrolithiasis  Patient CT shows innumerable benign cysts throughout both kidneys measuring up to 6 cm on the right and 6.3 cm on the left.  Also shows multiple nonobstructing renal stones measuring up to 4 mm.             Diet: NPO for Medical/Clinical Reasons Except for: Meds, Ice Chips    DVT Prophylaxis: Pneumatic Compression Devices  Kumari Catheter: Not present  Lines: None     Cardiac Monitoring: None  Code Status: Full Code      Clinically Significant Risk Factors Present on Admission           # Hypocalcemia: Lowest Ca = 8.3 mg/dL in last 2 days, will monitor and replace as appropriate      # Coagulation Defect: INR = 1.26 (Ref range: 0.85 - 1.15) and/or PTT = N/A, will monitor for bleeding         # Anemia: based on hgb <11  # Anemia: based on hgb <11                  Disposition Plan     Medically Ready for Discharge: Anticipated in 2-4 Days           Will Escalona MD  Hospitalist Service  [facility]  Securely message with [employer] (more info)  Text page via [facility] Paging/Directory     ______________________________________________________________________    Chief Complaint   Vomiting blood    History is obtained from the patient    History of Present Illness   Willi Lares is a 30 year old male with medical history significant for azoospermia.  He presented to the ED with complaints of vomiting blood and also having dark watery bowel movements.  Patient reports that symptoms started about noon today.  He reports he had 2 episodes of bloody emesis that he collected in the bag ad brought to the ED.  He reports that watery stools.  He denies any abdominal pain.  He reports feeling lightheaded and feeling cold.  Denies any cough, chest pain, shortness of breath, dysuria, hematuria, fevers, chills, headache, vision changes, abdominal pain, swelling of the abdomen, extremity edema, or any other new symptoms.  He denies any history of tobacco, alcohol, or illicit drug use.  Denies any history of hepatitis infections.  Denies any recent travel.    Per father at bedside, patient's mother had history of polycystic kidney disease.  She passed away from MS.        Past Medical History    No past medical  history on file.    Past Surgical History   Past Surgical History:   Procedure Laterality Date    CYSTOSCOPY, TRANSURETHRAL RESECTION (TUR) EJACULATORY DUCT(S) Bilateral 11/15/2024    Procedure: CYSTOURETHROSCOPY, WITH TRANSURETHRAL RESECTION OF  EJACULATORY DUCTS BILATERAL;  Surgeon: Nahun Hartmann MD;  Location: [facility] OR    TRANSRECTAL ULTRASONIC SONOGRAM N/A 11/15/2024    Procedure: transrectal ultrasound, inject seminal vesicle with methylene blue.;  Surgeon: Nahun Hartmann MD;  Location: [facility] OR       Prior to Admission Medications   None        Review of Systems    As per HPI otherwise 14 point review of systems is negative.     Physical Exam   Vital Signs: Temp: 98.5  F (36.9  C) Temp src: Temporal BP: 121/72 Pulse: 88   Resp: 19 SpO2: 99 % O2 Device: None (Room air)    Weight: 165 lbs 12.57 oz    General: AAOx3.  Appears anxious.  Pleasant.  Father at bedside.  HEENT: NCAT, pupils are equal and round, anicteric, oral mucosa is moist.  Neck: Supple,  Cardiac: RRR.  No murmur. No rub or gallop.  Respiratory: CTAB, no crackles, wheezes, rales, or rhonchi  Abdomen: Soft, NT, ND, + bowel sounds  Extremity: No LE edema, DP pulses palpable.   Neuro: AAO x3,   Psych: Calm and cooperative.       Medical Decision Making       >55 MINUTES SPENT BY ME on the date of service doing chart review, history, exam, documentation & further activities per the note.      Data     I have personally reviewed the following data over the past 24 hrs:    8.9  \   9.8 (L)   / 151     140 107 39.5 (H) /  150 (H)   3.5 24 0.74 \     ALT: 35 AST: 32 AP: 47 TBILI: 1.0   ALB: 4.0 TOT PROTEIN: 6.0 (L) LIPASE: N/A     INR:  1.26 (H) PTT:  N/A   D-dimer:  N/A Fibrinogen:  N/A       Imaging results reviewed over the past 24 hrs:   Recent Results (from the past 24 hours)   CTA Abdomen Pelvis with Contrast    Narrative    EXAM: CTA ABDOMEN PELVIS WITH CONTRAST  LOCATION: [facility]  DATE: 3/8/2025    INDICATION:  Hematemesis. Melena.  COMPARISON: MRI pelvis 8/9/2024.  TECHNIQUE: CT angiogram abdomen pelvis during arterial phase of injection of IV contrast. 2D and 3D MIP reconstructions were performed by the CT technologist. Dose reduction techniques were used.  CONTRAST: 83mL isovue 370    FINDINGS:    ANGIOGRAM ABDOMEN/PELVIS: No evidence of active gastrointestinal bleeding. No abdominal aortic aneurysm. Celiac artery and its branches are patent. Superior mesenteric artery and its visualized branches are patent. Bilateral single renal arteries are   patent. Inferior mesenteric artery and its visualized branches are patent. Bilateral iliofemoral veins are patent. Portal, splenic, and superior mesenteric veins are patent. Extensive gastroesophageal varices. Multiple left upper quadrant varices   including a small splenorenal shunt. IVC is patent.    LOWER CHEST: Normal.    HEPATOBILIARY: Cirrhotic liver morphology. Tiny right hepatic cyst. No suspicious liver lesions. No calcified gallstones or biliary ductal dilation.    PANCREAS: Normal.    SPLEEN: Mild splenomegaly measuring 13.5 cm.    ADRENAL GLANDS: Normal.    KIDNEYS/BLADDER: Innumerable benign cysts throughout both kidneys measuring up to 6.9 cm on the right and 6.3 cm on the left. Single nonobstructing 4 mm right lower pole renal calculus. Three nonobstructing left renal calculi measuring up to 4 mm. No   hydronephrosis. No ureteral calculi. Normal bladder.    BOWEL: No bowel obstruction or small bowel inflammation. Normal appendix. Mild wall thickening of the ascending and proximal transverse colon.    PERITONEUM/RETROPERITONEUM: Small amount of ascites. Mesenteric edema.    LYMPH NODES: No enlarged lymph nodes.    PELVIC ORGANS: Fluid distends both seminal vesicles. 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     MUSCULOSKELETAL: No acute bony abnormality or destructive bone lesions.      Impression    IMPRESSION:    1.  No evidence of active gastrointestinal  bleeding.    2.  Cirrhotic liver with features of portal hypertension including splenomegaly, a small amount of ascites, and varices including extensive gastroesophageal varices.    3.  Mild wall thickening of the ascending and proximal transverse colon is favored to reflect portal colopathy, although a mild colitis would appear similar.    4.  Innumerable benign cysts throughout both kidneys, which do not require follow-up.    5.  Bilateral nonobstructing intrarenal calculi measuring up to 4 mm on both sides.    6.  Fluid distends both seminal vesicles, likely related to the recent transurethral resection of the ejaculatory ducts. No abscess.

## 2025-03-08 NOTE — ED TRIAGE NOTES
Presents to triage with c/o green to black diarrhea that started around 0200 and hematemesis that started about 20 min PTA. Patient states the emesis looked like coffee grounds and contained dark red blood. No previous GI hx, denies abdominal pain. Also endorses some dizziness since onset of symptoms.   As writer was finishing triage patient had an XL episode of emesis that appeared to be dark red kacey blood

## 2025-03-08 NOTE — ED PROVIDER NOTES
"  Emergency Department Note      History of Present Illness     Chief Complaint   Melena and Hematemesis      HPI   Willi Lares is a 30 year old male with no significant past medical history presents to the emergency department with family for evaluation of hematemesis and melena.  Patient reports he has had 4 episodes of black diarrhea starting at 0200.  He is also had coffee-ground looking emesis and about 30 minutes prior to coming to the ER, had dark red/bright red hematemesis.  Reports feeling a little lightheaded.  Denies abdominal pain.  Denies prior history of GI bleeding.  Denies significant alcohol use or NSAID use.  Denies any urinary symptoms, fever, chest pain, shortness of breath.  He does not take any prescription medications.  Denies any significant Tylenol use.    Independent Historian   None    Review of External Notes   None    Past Medical History     Medical History and Problem List   No past medical history on file.    Medications   No current outpatient medications on file.      Surgical History   Past Surgical History:   Procedure Laterality Date    CYSTOSCOPY, TRANSURETHRAL RESECTION (TUR) EJACULATORY DUCT(S) Bilateral 11/15/2024    Procedure: CYSTOURETHROSCOPY, WITH TRANSURETHRAL RESECTION OF  EJACULATORY DUCTS BILATERAL;  Surgeon: Nahun Hartmann MD;  Location: [facility] [place]    TRANSRECTAL ULTRASONIC SONOGRAM N/A 11/15/2024    Procedure: transrectal ultrasound, inject seminal vesicle with methylene blue.;  Surgeon: Nahun Hartmann MD;  Location: [facility] [place]       Physical Exam     Patient Vitals for the past 24 hrs:   BP Temp Temp src Pulse Resp SpO2 Height Weight   03/08/25 1642 113/63 -- -- 81 11 93 % -- --   03/08/25 1617 121/72 -- -- 88 19 99 % -- --   03/08/25 1602 120/67 -- -- 80 15 98 % -- --   03/08/25 1552 -- -- -- -- -- -- 1.803 m (5' 11\") --   03/08/25 1543 125/71 -- -- 73 17 98 % -- --   03/08/25 1528 121/75 -- -- 84 15 99 % -- --   03/08/25 1445 122/72 -- -- 94 15 96 % -- -- "   03/08/25 1430 116/72 -- -- 99 19 96 % -- --   03/08/25 1415 124/72 -- -- 98 12 -- -- --   03/08/25 1400 120/77 -- -- 92 11 -- -- --   03/08/25 1338 139/79 98.5  F (36.9  C) Temporal 108 18 98 % -- 75.2 kg (165 lb 12.6 oz)     Physical Exam  General: Alert, no acute distress; pale appearing; holding bucket containing ~100 ml bright red vomitius  HEENT:  Moist mucous membranes.  Conjunctiva normal.   CV:  RRR, no m/r/g, skin warm and well perfused  Pulm:  CTAB, no wheezes/ronchi/rales.  No acute distress, breathing comfortably  GI:  Soft, nontender, nondistended.  No rebound or guarding.   MSK:  Moving all extremities.  No focal areas of edema, erythema  Skin:  WWP, no rashes, no lower extremity edema, skin color pale, no diaphoresis    Diagnostics     Lab Results   Labs Ordered and Resulted from Time of ED Arrival to Time of ED Departure   COMPREHENSIVE METABOLIC PANEL - Abnormal       Result Value    Sodium 140      Potassium 3.5      Carbon Dioxide (CO2) 24      Anion Gap 9      Urea Nitrogen 39.5 (*)     Creatinine 0.74      GFR Estimate >90      Calcium 8.3 (*)     Chloride 107      Glucose 150 (*)     Alkaline Phosphatase 47      AST 32      ALT 35      Protein Total 6.0 (*)     Albumin 4.0      Bilirubin Total 1.0     INR - Abnormal    INR 1.26 (*)    CBC WITH PLATELETS AND DIFFERENTIAL - Abnormal    WBC Count 8.9      RBC Count 3.44 (*)     Hemoglobin 10.3 (*)     Hematocrit 30.9 (*)     MCV 90      MCH 29.9      MCHC 33.3      RDW 13.5      Platelet Count 151      % Neutrophils 67      % Lymphocytes 25      % Monocytes 6      % Eosinophils 1      % Basophils 0      % Immature Granulocytes 0      NRBCs per 100 WBC 0      Absolute Neutrophils 6.0      Absolute Lymphocytes 2.3      Absolute Monocytes 0.6      Absolute Eosinophils 0.1      Absolute Basophils 0.0      Absolute Immature Granulocytes 0.0      Absolute NRBCs 0.0     HEMOGLOBIN - Abnormal    Hemoglobin 9.8 (*)    HEPATITIS C ANTIBODY   TYPE AND  SCREEN, ADULT    ABO/RH(D) O POS      Antibody Screen Negative      SPECIMEN EXPIRATION DATE [id number]     ABO/RH TYPE AND SCREEN       Imaging   CTA Abdomen Pelvis with Contrast   Final Result   IMPRESSION:      1.  No evidence of active gastrointestinal bleeding.      2.  Cirrhotic liver with features of portal hypertension including splenomegaly, a small amount of ascites, and varices including extensive gastroesophageal varices.      3.  Mild wall thickening of the ascending and proximal transverse colon is favored to reflect portal colopathy, although a mild colitis would appear similar.      4.  Innumerable benign cysts throughout both kidneys, which do not require follow-up.      5.  Bilateral nonobstructing intrarenal calculi measuring up to 4 mm on both sides.      6.  Fluid distends both seminal vesicles, likely related to the recent transurethral resection of the ejaculatory ducts. No abscess.      US Abdomen Limited w Abd/Pelvis Duplex Complete    (Results Pending)       EKG   None    Independent Interpretation   None    ED Course      Medications Administered   Medications   ondansetron (ZOFRAN) injection 4 mg (4 mg Intravenous $Given 3/8/25 1631)   octreotide (sandoSTATIN) 1,250 mcg in D5W 250 mL (50 mcg/hr Intravenous $New Bag 3/8/25 1656)   prochlorperazine (COMPAZINE) injection 5 mg (has no administration in time range)   lidocaine 1 % 0.1-1 mL (has no administration in time range)   lidocaine (LMX4) cream (has no administration in time range)   sodium chloride (PF) 0.9% PF flush 3 mL (has no administration in time range)   sodium chloride (PF) 0.9% PF flush 3 mL (has no administration in time range)   senna-docusate (SENOKOT-S/PERICOLACE) 8.6-50 MG per tablet 1 tablet (has no administration in time range)     Or   senna-docusate (SENOKOT-S/PERICOLACE) 8.6-50 MG per tablet 2 tablet (has no administration in time range)   calcium carbonate (TUMS) chewable tablet 1,000 mg (has no administration  in time range)   pantoprazole (PROTONIX) IV push injection 40 mg (has no administration in time range)   cefTRIAXone (ROCEPHIN) 2 g vial to attach to  ml bag for ADULTS or NS 50 ml bag for PEDS (has no administration in time range)   sodium chloride 0.9 % infusion (has no administration in time range)   pantoprazole (PROTONIX) IV push injection 80 mg (80 mg Intravenous $Given 3/8/25 1408)   iopamidol (ISOVUE-370) solution 83 mL (83 mLs Intravenous $Given 3/8/25 1454)   sodium chloride (PF) 0.9% PF flush 68 mL (68 mLs Intravenous $Given 3/8/25 1455)   octreotide (sandoSTATIN) injection 50 mcg (50 mcg Intravenous $Given 3/8/25 1647)   cefTRIAXone (ROCEPHIN) 1 g vial to attach to  mL bag for ADULTS or NS 50 mL bag for PEDS (1 g Intravenous $New Bag 3/8/25 1659)       Procedures   Procedures     Discussion of Management   Gasteroenterology    Hospitalist service    ED Course   ED Course as of 03/08/25 1812   Sat Mar 08, 2025   1628 I spoke with GI, Dr. Landaverde.  Agrees with IV Protonix, octreotide, ceftriaxone.  Requests obtaining liver ultrasound with Doppler.  If patient remains hemodynamically stable, plan to perform endoscopy in the a.m. unless clinical deterioration warrants more emergent endoscopy.   1642 I spoke with Dr. Escalona of hospitalist service who accepted patient for admission.        Additional Documentation  None    Medical Decision Making / Diagnosis     CMS Diagnoses: IV Antibiotics given and/or elevated Lactate of 0 and no sepsis note found - Delete this reminder and enter the sepsis note or '.edcms' before signing chart.>>>None    MIPS       None    [facility]   Willi Lares is a 30 year old male with no significant past medical history presents to the emergency department for evaluation of melena and hematemesis.  See above for the details in HPI and exam.  Patient is hemodynamically stable and otherwise well-appearing.  Denies any abdominal pain and has benign abdominal exam.  Denies any  alcohol use, excessive NSAID or Tylenol use.  Lab studies notable for hemoglobin of 10.3, no prior to compare.  INR 1.26.  Other labs largely unremarkable.  CTA abdomen obtained concerning for liver cirrhosis with portal hypertension and extensive gastroesophageal varices.  No evidence of active bleeding.  Additional incidental findings above also discussed with the patient.  Given the above findings, IV Protonix, octreotide, ceftriaxone for prophylaxis given.  He is had no further episodes of hematemesis or melena and remains hemodynamically stable.  Notify GI who is aware of patient and given clinical stability, we will plan for endoscopy in the morning unless clinical deterioration necessitates more emergent procedure.  Patient and father updated on plan.  Discussed patient with hospitalist service accepted the patient for admission.    Disposition   The patient was admitted to the hospital.     Diagnosis     ICD-10-CM    1. Upper GI bleed  K92.2 Case Request: ESOPHAGOGASTRODUODENOSCOPY     Case Request: ESOPHAGOGASTRODUODENOSCOPY      2. Cirrhosis of liver with ascites, unspecified hepatic cirrhosis type (H)  K74.60 Case Request: ESOPHAGOGASTRODUODENOSCOPY    R18.8 Case Request: ESOPHAGOGASTRODUODENOSCOPY           Discharge Medications   New Prescriptions    No medications on file         MD Keli Chadwick Edgar Ronald, MD  03/08/25 1812       Caleb Dickey MD  03/08/25 181

## 2025-03-08 NOTE — LETTER
[facility]  [address]  [facility] [address]  Phone: [phone]  Fax: [phone]    March 14, 2025        Willi Lares  [address] [facility] [address]          To whom it may concern:    RE: Willi VALDIVIA Luda Márquez has been hospitalized under my care from 3/8/25 through 3/14/25. He can return to work on 3/31/25        Sincerely,      Guanako Dalal MG

## 2025-03-08 NOTE — ED NOTES
"[facility]  ED Nurse Handoff Report    ED Chief complaint: Melena and Hematemesis  . ED Diagnosis:   Final diagnoses:   None       Allergies: No Known Allergies    Code Status: Full Code    Activity level - Baseline/Home:  independent.  Activity Level - Current:   independent.   Lift room needed: No.   Bariatric: No   Needed: No   Isolation: No.   Infection: Not Applicable.     Respiratory status: Room air    Vital Signs (within 30 minutes):   Vitals:    03/08/25 1415 03/08/25 1430 03/08/25 1445 03/08/25 1552   BP: 124/72 116/72 122/72    Pulse: 98 99 94    Resp: 12 19 15    Temp:       TempSrc:       SpO2:  96% 96%    Weight:       Height:    1.803 m (5' 11\")       Cardiac Rhythm:  ,      Pain level:    Patient confused: No.  Patient Falls Risk: nonskid shoes/slippers when out of bed.   Elimination Status: Has voided     Patient Report - Initial Complaint: Vomiting/melena.   Focused Assessment: Pt here after vomiting blood. Found to have cirrotic liver and extensive varices.     Abnormal Results:   Labs Ordered and Resulted from Time of ED Arrival to Time of ED Departure   COMPREHENSIVE METABOLIC PANEL - Abnormal       Result Value    Sodium 140      Potassium 3.5      Carbon Dioxide (CO2) 24      Anion Gap 9      Urea Nitrogen 39.5 (*)     Creatinine 0.74      GFR Estimate >90      Calcium 8.3 (*)     Chloride 107      Glucose 150 (*)     Alkaline Phosphatase 47      AST 32      ALT 35      Protein Total 6.0 (*)     Albumin 4.0      Bilirubin Total 1.0     INR - Abnormal    INR 1.26 (*)    CBC WITH PLATELETS AND DIFFERENTIAL - Abnormal    WBC Count 8.9      RBC Count 3.44 (*)     Hemoglobin 10.3 (*)     Hematocrit 30.9 (*)     MCV 90      MCH 29.9      MCHC 33.3      RDW 13.5      Platelet Count 151      % Neutrophils 67      % Lymphocytes 25      % Monocytes 6      % Eosinophils 1      % Basophils 0      % Immature Granulocytes 0      NRBCs per 100 WBC 0      Absolute Neutrophils " 6.0      Absolute Lymphocytes 2.3      Absolute Monocytes 0.6      Absolute Eosinophils 0.1      Absolute Basophils 0.0      Absolute Immature Granulocytes 0.0      Absolute NRBCs 0.0     TYPE AND SCREEN, ADULT    ABO/RH(D) O POS      Antibody Screen Negative      SPECIMEN EXPIRATION DATE [id number]     ABO/RH TYPE AND SCREEN        CTA Abdomen Pelvis with Contrast   Final Result   IMPRESSION:      1.  No evidence of active gastrointestinal bleeding.      2.  Cirrhotic liver with features of portal hypertension including splenomegaly, a small amount of ascites, and varices including extensive gastroesophageal varices.      3.  Mild wall thickening of the ascending and proximal transverse colon is favored to reflect portal colopathy, although a mild colitis would appear similar.      4.  Innumerable benign cysts throughout both kidneys, which do not require follow-up.      5.  Bilateral nonobstructing intrarenal calculi measuring up to 4 mm on both sides.      6.  Fluid distends both seminal vesicles, likely related to the recent transurethral resection of the ejaculatory ducts. No abscess.          Treatments provided: Labs, imaging, monitoring, medications.  Family Comments: At bedside.  OBS brochure/video discussed/provided to patient:  Yes  ED Medications:   Medications   ondansetron (ZOFRAN) injection 4 mg (4 mg Intravenous $Given 3/8/25 1409)   pantoprazole (PROTONIX) IV push injection 80 mg (80 mg Intravenous $Given 3/8/25 1408)   iopamidol (ISOVUE-370) solution 83 mL (83 mLs Intravenous $Given 3/8/25 1454)   sodium chloride (PF) 0.9% PF flush 68 mL (68 mLs Intravenous $Given 3/8/25 1455)       Drips infusing:  No  For the majority of the shift this patient was Green.   Interventions performed were NA.    Sepsis treatment initiated: No    Cares/treatment/interventions/medications to be completed following ED care: Ongoing work up    ED Nurse Name: Mary Alfaro RN  3:56 PM    RECEIVING UNIT ED HANDOFF  REVIEW    Above ED Nurse Handoff Report was reviewed: Yes  Reviewed by: Tere Baer, RN on March 8, 2025 at 5:18 PM   I Dina called the ED to inform them the note was read: Yes

## 2025-03-08 NOTE — PHARMACY-ADMISSION MEDICATION HISTORY
Pharmacy Intern Admission Medication History    Admission medication history is complete. The information provided in this note is only as accurate as the sources available at the time of the update.    Information Source(s): Patient via in-person    Pertinent Information: No medications PTA     Changes made to PTA medication list:  Added: None  Deleted: None  Changed: None    Allergies reviewed with patient and updates made in EHR: yes    Medication History Completed By: Jamila Angulo RPH 3/8/2025 4:59 PM    No outpatient medications have been marked as taking for the 3/8/25 encounter (Hospital Encounter).

## 2025-03-09 ENCOUNTER — ANESTHESIA (OUTPATIENT)
Dept: SURGERY | Facility: CLINIC | Age: 30
End: 2025-03-09
Payer: COMMERCIAL

## 2025-03-09 ENCOUNTER — ANESTHESIA EVENT (OUTPATIENT)
Dept: SURGERY | Facility: CLINIC | Age: 30
End: 2025-03-09
Payer: COMMERCIAL

## 2025-03-09 LAB
ANION GAP SERPL CALCULATED.3IONS-SCNC: 4 MMOL/L (ref 7–15)
BLD PROD TYP BPU: NORMAL
BLOOD COMPONENT TYPE: NORMAL
BUN SERPL-MCNC: 35 MG/DL (ref 6–20)
CALCIUM SERPL-MCNC: 7.9 MG/DL (ref 8.8–10.4)
CHLORIDE SERPL-SCNC: 111 MMOL/L (ref 98–107)
CODING SYSTEM: NORMAL
CREAT SERPL-MCNC: 0.98 MG/DL (ref 0.67–1.17)
CROSSMATCH: NORMAL
EGFRCR SERPLBLD CKD-EPI 2021: >90 ML/MIN/1.73M2
ERYTHROCYTE [DISTWIDTH] IN BLOOD BY AUTOMATED COUNT: 13.9 % (ref 10–15)
GLUCOSE SERPL-MCNC: 140 MG/DL (ref 70–99)
HCO3 SERPL-SCNC: 26 MMOL/L (ref 22–29)
HCT VFR BLD AUTO: 22.2 % (ref 40–53)
HGB BLD-MCNC: 7.5 G/DL (ref 13.3–17.7)
HGB BLD-MCNC: 7.6 G/DL (ref 13.3–17.7)
HGB BLD-MCNC: 8.4 G/DL (ref 13.3–17.7)
HGB BLD-MCNC: 9 G/DL (ref 13.3–17.7)
ISSUE DATE AND TIME: NORMAL
MCH RBC QN AUTO: 30.2 PG (ref 26.5–33)
MCHC RBC AUTO-ENTMCNC: 33.8 G/DL (ref 31.5–36.5)
MCV RBC AUTO: 90 FL (ref 78–100)
PLATELET # BLD AUTO: 98 10E3/UL (ref 150–450)
POTASSIUM SERPL-SCNC: 4.3 MMOL/L (ref 3.4–5.3)
RBC # BLD AUTO: 2.48 10E6/UL (ref 4.4–5.9)
SODIUM SERPL-SCNC: 141 MMOL/L (ref 135–145)
UNIT ABO/RH: NORMAL
UNIT NUMBER: NORMAL
UNIT STATUS: NORMAL
UNIT TYPE ISBT: 5100
UPPER GI ENDOSCOPY: NORMAL
WBC # BLD AUTO: 6 10E3/UL (ref 4–11)

## 2025-03-09 PROCEDURE — 999N000141 HC STATISTIC PRE-PROCEDURE NURSING ASSESSMENT: Performed by: INTERNAL MEDICINE

## 2025-03-09 PROCEDURE — 370N000017 HC ANESTHESIA TECHNICAL FEE, PER MIN: Performed by: INTERNAL MEDICINE

## 2025-03-09 PROCEDURE — 86015 ACTIN ANTIBODY EACH: CPT | Performed by: INTERNAL MEDICINE

## 2025-03-09 PROCEDURE — 82390 ASSAY OF CERULOPLASMIN: CPT | Performed by: INTERNAL MEDICINE

## 2025-03-09 PROCEDURE — 85018 HEMOGLOBIN: CPT | Performed by: STUDENT IN AN ORGANIZED HEALTH CARE EDUCATION/TRAINING PROGRAM

## 2025-03-09 PROCEDURE — 36415 COLL VENOUS BLD VENIPUNCTURE: CPT | Performed by: INTERNAL MEDICINE

## 2025-03-09 PROCEDURE — 120N000001 HC R&B MED SURG/OB

## 2025-03-09 PROCEDURE — 85018 HEMOGLOBIN: CPT | Performed by: INTERNAL MEDICINE

## 2025-03-09 PROCEDURE — 250N000025 HC SEVOFLURANE, PER MIN: Performed by: INTERNAL MEDICINE

## 2025-03-09 PROCEDURE — 250N000011 HC RX IP 250 OP 636: Performed by: NURSE ANESTHETIST, CERTIFIED REGISTERED

## 2025-03-09 PROCEDURE — 258N000003 HC RX IP 258 OP 636: Performed by: NURSE ANESTHETIST, CERTIFIED REGISTERED

## 2025-03-09 PROCEDURE — 272N000001 HC OR GENERAL SUPPLY STERILE: Performed by: INTERNAL MEDICINE

## 2025-03-09 PROCEDURE — 250N000011 HC RX IP 250 OP 636: Performed by: INTERNAL MEDICINE

## 2025-03-09 PROCEDURE — 36415 COLL VENOUS BLD VENIPUNCTURE: CPT | Performed by: STUDENT IN AN ORGANIZED HEALTH CARE EDUCATION/TRAINING PROGRAM

## 2025-03-09 PROCEDURE — 82435 ASSAY OF BLOOD CHLORIDE: CPT | Performed by: INTERNAL MEDICINE

## 2025-03-09 PROCEDURE — 82374 ASSAY BLOOD CARBON DIOXIDE: CPT | Performed by: INTERNAL MEDICINE

## 2025-03-09 PROCEDURE — 80048 BASIC METABOLIC PNL TOTAL CA: CPT | Performed by: INTERNAL MEDICINE

## 2025-03-09 PROCEDURE — 06L38CZ OCCLUSION OF ESOPHAGEAL VEIN WITH EXTRALUMINAL DEVICE, VIA NATURAL OR ARTIFICIAL OPENING ENDOSCOPIC: ICD-10-PCS | Performed by: INTERNAL MEDICINE

## 2025-03-09 PROCEDURE — 250N000011 HC RX IP 250 OP 636: Mod: JA | Performed by: EMERGENCY MEDICINE

## 2025-03-09 PROCEDURE — 86381 MITOCHONDRIAL ANTIBODY EACH: CPT | Performed by: INTERNAL MEDICINE

## 2025-03-09 PROCEDURE — 258N000003 HC RX IP 258 OP 636: Performed by: EMERGENCY MEDICINE

## 2025-03-09 PROCEDURE — 710N000009 HC RECOVERY PHASE 1, LEVEL 1, PER MIN: Performed by: INTERNAL MEDICINE

## 2025-03-09 PROCEDURE — 360N000076 HC SURGERY LEVEL 3, PER MIN: Performed by: INTERNAL MEDICINE

## 2025-03-09 PROCEDURE — 99232 SBSQ HOSP IP/OBS MODERATE 35: CPT | Performed by: STUDENT IN AN ORGANIZED HEALTH CARE EDUCATION/TRAINING PROGRAM

## 2025-03-09 PROCEDURE — 250N000009 HC RX 250: Performed by: NURSE ANESTHETIST, CERTIFIED REGISTERED

## 2025-03-09 PROCEDURE — P9016 RBC LEUKOCYTES REDUCED: HCPCS | Performed by: STUDENT IN AN ORGANIZED HEALTH CARE EDUCATION/TRAINING PROGRAM

## 2025-03-09 PROCEDURE — 258N000003 HC RX IP 258 OP 636: Performed by: INTERNAL MEDICINE

## 2025-03-09 RX ORDER — FENTANYL CITRATE 50 UG/ML
INJECTION, SOLUTION INTRAMUSCULAR; INTRAVENOUS PRN
Status: DISCONTINUED | OUTPATIENT
Start: 2025-03-09 | End: 2025-03-09

## 2025-03-09 RX ORDER — HYDROMORPHONE HYDROCHLORIDE 1 MG/ML
0.5 INJECTION, SOLUTION INTRAMUSCULAR; INTRAVENOUS; SUBCUTANEOUS EVERY 5 MIN PRN
Status: DISCONTINUED | OUTPATIENT
Start: 2025-03-09 | End: 2025-03-09 | Stop reason: HOSPADM

## 2025-03-09 RX ORDER — ONDANSETRON 4 MG/1
4 TABLET, ORALLY DISINTEGRATING ORAL EVERY 30 MIN PRN
Status: DISCONTINUED | OUTPATIENT
Start: 2025-03-09 | End: 2025-03-09 | Stop reason: HOSPADM

## 2025-03-09 RX ORDER — ONDANSETRON 2 MG/ML
4 INJECTION INTRAMUSCULAR; INTRAVENOUS EVERY 30 MIN PRN
Status: DISCONTINUED | OUTPATIENT
Start: 2025-03-09 | End: 2025-03-09 | Stop reason: HOSPADM

## 2025-03-09 RX ORDER — PROPOFOL 10 MG/ML
INJECTION, EMULSION INTRAVENOUS PRN
Status: DISCONTINUED | OUTPATIENT
Start: 2025-03-09 | End: 2025-03-09

## 2025-03-09 RX ORDER — MAGNESIUM SULFATE HEPTAHYDRATE 40 MG/ML
2 INJECTION, SOLUTION INTRAVENOUS
Status: DISCONTINUED | OUTPATIENT
Start: 2025-03-09 | End: 2025-03-09 | Stop reason: HOSPADM

## 2025-03-09 RX ORDER — DEXAMETHASONE SODIUM PHOSPHATE 4 MG/ML
4 INJECTION, SOLUTION INTRA-ARTICULAR; INTRALESIONAL; INTRAMUSCULAR; INTRAVENOUS; SOFT TISSUE
Status: DISCONTINUED | OUTPATIENT
Start: 2025-03-09 | End: 2025-03-09 | Stop reason: HOSPADM

## 2025-03-09 RX ORDER — ONDANSETRON 2 MG/ML
INJECTION INTRAMUSCULAR; INTRAVENOUS PRN
Status: DISCONTINUED | OUTPATIENT
Start: 2025-03-09 | End: 2025-03-09

## 2025-03-09 RX ORDER — LABETALOL HYDROCHLORIDE 5 MG/ML
10 INJECTION, SOLUTION INTRAVENOUS
Status: DISCONTINUED | OUTPATIENT
Start: 2025-03-09 | End: 2025-03-09 | Stop reason: HOSPADM

## 2025-03-09 RX ORDER — LIDOCAINE 40 MG/G
CREAM TOPICAL
Status: DISCONTINUED | OUTPATIENT
Start: 2025-03-09 | End: 2025-03-09 | Stop reason: HOSPADM

## 2025-03-09 RX ORDER — KETOROLAC TROMETHAMINE 15 MG/ML
15 INJECTION, SOLUTION INTRAMUSCULAR; INTRAVENOUS
Status: DISCONTINUED | OUTPATIENT
Start: 2025-03-09 | End: 2025-03-09 | Stop reason: HOSPADM

## 2025-03-09 RX ORDER — NALOXONE HYDROCHLORIDE 0.4 MG/ML
0.1 INJECTION, SOLUTION INTRAMUSCULAR; INTRAVENOUS; SUBCUTANEOUS
Status: DISCONTINUED | OUTPATIENT
Start: 2025-03-09 | End: 2025-03-09 | Stop reason: HOSPADM

## 2025-03-09 RX ORDER — OXYCODONE HYDROCHLORIDE 5 MG/1
5 TABLET ORAL
Status: DISCONTINUED | OUTPATIENT
Start: 2025-03-09 | End: 2025-03-09 | Stop reason: HOSPADM

## 2025-03-09 RX ORDER — DEXAMETHASONE SODIUM PHOSPHATE 4 MG/ML
INJECTION, SOLUTION INTRA-ARTICULAR; INTRALESIONAL; INTRAMUSCULAR; INTRAVENOUS; SOFT TISSUE PRN
Status: DISCONTINUED | OUTPATIENT
Start: 2025-03-09 | End: 2025-03-09

## 2025-03-09 RX ORDER — SODIUM CHLORIDE, SODIUM LACTATE, POTASSIUM CHLORIDE, CALCIUM CHLORIDE 600; 310; 30; 20 MG/100ML; MG/100ML; MG/100ML; MG/100ML
INJECTION, SOLUTION INTRAVENOUS CONTINUOUS
Status: DISCONTINUED | OUTPATIENT
Start: 2025-03-09 | End: 2025-03-09 | Stop reason: HOSPADM

## 2025-03-09 RX ORDER — OXYCODONE HYDROCHLORIDE 5 MG/1
10 TABLET ORAL
Status: DISCONTINUED | OUTPATIENT
Start: 2025-03-09 | End: 2025-03-09 | Stop reason: HOSPADM

## 2025-03-09 RX ORDER — SODIUM CHLORIDE, SODIUM LACTATE, POTASSIUM CHLORIDE, CALCIUM CHLORIDE 600; 310; 30; 20 MG/100ML; MG/100ML; MG/100ML; MG/100ML
INJECTION, SOLUTION INTRAVENOUS CONTINUOUS PRN
Status: DISCONTINUED | OUTPATIENT
Start: 2025-03-09 | End: 2025-03-09

## 2025-03-09 RX ORDER — ALBUTEROL SULFATE 0.83 MG/ML
2.5 SOLUTION RESPIRATORY (INHALATION) EVERY 4 HOURS PRN
Status: DISCONTINUED | OUTPATIENT
Start: 2025-03-09 | End: 2025-03-09 | Stop reason: HOSPADM

## 2025-03-09 RX ORDER — FENTANYL CITRATE 50 UG/ML
50 INJECTION, SOLUTION INTRAMUSCULAR; INTRAVENOUS EVERY 5 MIN PRN
Status: DISCONTINUED | OUTPATIENT
Start: 2025-03-09 | End: 2025-03-09 | Stop reason: HOSPADM

## 2025-03-09 RX ORDER — FENTANYL CITRATE 50 UG/ML
25 INJECTION, SOLUTION INTRAMUSCULAR; INTRAVENOUS EVERY 5 MIN PRN
Status: DISCONTINUED | OUTPATIENT
Start: 2025-03-09 | End: 2025-03-09 | Stop reason: HOSPADM

## 2025-03-09 RX ORDER — HYDRALAZINE HYDROCHLORIDE 20 MG/ML
5-10 INJECTION INTRAMUSCULAR; INTRAVENOUS EVERY 10 MIN PRN
Status: DISCONTINUED | OUTPATIENT
Start: 2025-03-09 | End: 2025-03-09 | Stop reason: HOSPADM

## 2025-03-09 RX ORDER — LIDOCAINE HYDROCHLORIDE 20 MG/ML
INJECTION, SOLUTION INFILTRATION; PERINEURAL PRN
Status: DISCONTINUED | OUTPATIENT
Start: 2025-03-09 | End: 2025-03-09

## 2025-03-09 RX ADMIN — PHENYLEPHRINE HYDROCHLORIDE 200 MCG: 10 INJECTION INTRAVENOUS at 13:52

## 2025-03-09 RX ADMIN — PANTOPRAZOLE SODIUM 40 MG: 40 INJECTION, POWDER, FOR SOLUTION INTRAVENOUS at 08:39

## 2025-03-09 RX ADMIN — PANTOPRAZOLE SODIUM 40 MG: 40 INJECTION, POWDER, FOR SOLUTION INTRAVENOUS at 20:30

## 2025-03-09 RX ADMIN — LIDOCAINE HYDROCHLORIDE 50 MG: 20 INJECTION, SOLUTION INFILTRATION; PERINEURAL at 13:52

## 2025-03-09 RX ADMIN — DEXAMETHASONE SODIUM PHOSPHATE 8 MG: 4 INJECTION, SOLUTION INTRA-ARTICULAR; INTRALESIONAL; INTRAMUSCULAR; INTRAVENOUS; SOFT TISSUE at 13:52

## 2025-03-09 RX ADMIN — FENTANYL CITRATE 100 MCG: 50 INJECTION INTRAMUSCULAR; INTRAVENOUS at 13:52

## 2025-03-09 RX ADMIN — PROPOFOL 40 MG: 10 INJECTION, EMULSION INTRAVENOUS at 14:04

## 2025-03-09 RX ADMIN — SODIUM CHLORIDE, POTASSIUM CHLORIDE, SODIUM LACTATE AND CALCIUM CHLORIDE: 600; 310; 30; 20 INJECTION, SOLUTION INTRAVENOUS at 12:03

## 2025-03-09 RX ADMIN — OCTREOTIDE ACETATE 50 MCG/HR: 200 INJECTION, SOLUTION INTRAVENOUS; SUBCUTANEOUS at 17:57

## 2025-03-09 RX ADMIN — SODIUM CHLORIDE: 0.9 INJECTION, SOLUTION INTRAVENOUS at 04:16

## 2025-03-09 RX ADMIN — MIDAZOLAM 2 MG: 1 INJECTION INTRAMUSCULAR; INTRAVENOUS at 13:50

## 2025-03-09 RX ADMIN — CEFTRIAXONE 2 G: 2 INJECTION, POWDER, FOR SOLUTION INTRAMUSCULAR; INTRAVENOUS at 17:12

## 2025-03-09 RX ADMIN — PROPOFOL 160 MG: 10 INJECTION, EMULSION INTRAVENOUS at 13:52

## 2025-03-09 RX ADMIN — ONDANSETRON 4 MG: 2 INJECTION INTRAMUSCULAR; INTRAVENOUS at 13:52

## 2025-03-09 RX ADMIN — Medication 100 MG: at 13:52

## 2025-03-09 ASSESSMENT — ACTIVITIES OF DAILY LIVING (ADL)
ADLS_ACUITY_SCORE: 39
ADLS_ACUITY_SCORE: 37
ADLS_ACUITY_SCORE: 37
ADLS_ACUITY_SCORE: 39
ADLS_ACUITY_SCORE: 37
ADLS_ACUITY_SCORE: 39
ADLS_ACUITY_SCORE: 37
ADLS_ACUITY_SCORE: 39
ADLS_ACUITY_SCORE: 37
ADLS_ACUITY_SCORE: 39
ADLS_ACUITY_SCORE: 39
ADLS_ACUITY_SCORE: 37
ADLS_ACUITY_SCORE: 37
ADLS_ACUITY_SCORE: 39
ADLS_ACUITY_SCORE: 37
ADLS_ACUITY_SCORE: 39
ADLS_ACUITY_SCORE: 37

## 2025-03-09 NOTE — CONSULTS
GASTROENTEROLOGY CONSULTATION      REASON FOR CONSULTATION: Hematemesis  CC/REFERRING MD: Pola  DATE OF SERVICE: 03/09/25       ASSESSMENT/PLAN:  30-year-old male with no pertinent past medical history who presented to the emergency department with acute onset hematemesis and melena with an associated hemoglobin drop, normal liver function tests, low platelets, INR 1.26 and abdominal CT showing cirrhosis with esophageal varices and an abdominal ultrasound showing normal vasculature with no clotting cirrhosis and trace ascites.  Regarding the patient's hematemesis, this is likely secondary to esophageal varices versus gastric varices versus peptic ulcer disease among other.  Will start by doing an upper endoscopy to evaluate and possibly treat.  Regarding the patient's cirrhosis on imaging there is no clear risk factor for this, there is polycystic kidney disease in the family but there are no visible cysts in the liver and is clearly cirrhotic by imaging and as well as high INR and low platelets.  Will start by doing hepatitis C serologies and autoimmune markers as well as I suspect this could be related to an autoimmune process.  He might need a liver biopsy at some point but first we will deal with the bleeding to stabilizing prior to proceeding with further workup of the liver.    MELD 3.0: 8 at 3/9/2025  8:07 AM  MELD-Na: 9 at 3/9/2025  8:07 AM  Calculated from:  Serum Creatinine: 0.98 mg/dL (Using min of 1 mg/dL) at 3/9/2025  8:07 AM  Serum Sodium: 141 mmol/L (Using max of 137 mmol/L) at 3/9/2025  8:07 AM  Total Bilirubin: 1 mg/dL at 3/8/2025  2:00 PM  Serum Albumin: 4 g/dL (Using max of 3.5 g/dL) at 3/8/2025  2:00 PM  INR(ratio): 1.26 at 3/8/2025  2:00 PM  Age at listing (hypothetical): 30 years  Sex: Male at 3/9/2025  8:07 AM    Recommendations  -N.p.o.  -Continue octreotide and PPI IV  -Urgent upper endoscopy today  -More recommendations after upper endoscopy  -Rule out blood work for autoimmune markers  other causes of liver disease      Otis Urrutia MD  Gastroenterology  [facility]    -------------------------------------------------------------------------------------------------------------------  HPI:    30-year-old male with no pertinent past medical history who presented to the emergency department with acute onset hematemesis and melena with an associated hemoglobin drop, normal liver function tests, low platelets, INR 1.26 and abdominal CT showing cirrhosis with esophageal varices and an abdominal ultrasound showing normal vasculature with no clotting cirrhosis and trace ascites.  Overall the patient denies any alcohol use, no IV snorted drug use, no previous blood transfusions and no other clear risk factors for hepatitis C or other infectious or cirrhosis.  He has not had any significant medical problems and overall thought he was very healthy up until yesterday when he started suddenly having hematemesis with associated melena.  Since he was admitted he has not had any further melena or hematemesis.  There is no family history of liver disease or other autoimmune processes aside from MS in his mother mother.    ROS:  10pt ROS performed and otherwise negative.    PERTINENT PAST MEDICAL HISTORY:  History reviewed. No pertinent past medical history.    PERTINENT MEDICATIONS:  No other NSAID/anticoagulation reported by patient.  No other OTC/herbal/supplements reported by patient.  Medications reviewed with patient today, see Medication List/Assessment for details.    SOCIAL HISTORY:    Social History     Tobacco Use    Smoking status: Never    Smokeless tobacco: Never       FAMILY HISTORY:  No colon/panc/other GI CA, no other [facility]-related [place].  No IBD/celiac, no AI/liver disease.    PHYSICAL EXAMINATION:  /63   Pulse 74   Temp 98.8  F (37.1  C) (Oral)   Resp 16   Ht 1.829 m (6')   Wt 71.7 kg (158 lb 1.1 oz)   SpO2 95%   BMI 21.44 kg/m      Gen: aaox3, cooperative, pleasant, not  dyspneic/diaphoretic, nad  HEENT: ncat, neck supple, no clad, normal op w/o ulcer/exudate, anicteric, mmm  Resp/CV unremarkable  Abd: Nontender nondistended positive bowel sounds no peritoneal signs  Ext: no c/c/e  Skin: warm, perfused, no jaundice  Neuro: grossly intact, no asterixis noted    PERTINENT STUDIES:  Reviewed and compared

## 2025-03-09 NOTE — PLAN OF CARE
"Goal Outcome Evaluation:      Plan of Care Reviewed With: patient    Overall Patient Progress: no changeOverall Patient Progress: no change    Outcome Evaluation: Pt alert and orientated. on RA. Denies pain. 2 PIVs infusing NS @ 100ml/hr and octreotide @ 10ml/hr. up SBA. Multiple loose bloody & black stools this shift. NPO for EGD today    Temp: 98.1  F (36.7  C) Temp src: Oral BP: 123/76 Pulse: 82   Resp: 18 SpO2: 98 % O2 Device: None (Room air)         Problem: Adult Inpatient Plan of Care  Goal: Plan of Care Review  Description: The Plan of Care Review/Shift note should be completed every shift.  The Outcome Evaluation is a brief statement about your assessment that the patient is improving, declining, or no change.  This information will be displayed automatically on your shift  note.  3/9/2025 0611 by Celeste Ordonez RN  Outcome: Not Progressing  Flowsheets (Taken 3/9/2025 0610)  Outcome Evaluation: Pt alert and orientated. on RA. Denies pain. 2 PIVs infusing NS @ 100ml/hr and octreotide @ 10ml/hr. up SBA. Multiple loose bloody & black stools this shift. NPO for EGD today  Plan of Care Reviewed With: patient  Overall Patient Progress: no change  3/8/2025 2021 by Celeste Ordonez RN  Outcome: Not Progressing  Flowsheets (Taken 3/8/2025 2021)  Plan of Care Reviewed With: patient  Overall Patient Progress: no change  Goal: Patient-Specific Goal (Individualized)  Description: You can add care plan individualizations to a care plan. Examples of Individualization might be:  \"Parent requests to be called daily at 9am for status\", \"I have a hard time hearing out of my right ear\", or \"Do not touch me to wake me up as it startles  me\".  3/9/2025 0611 by Celeste Ordonez RN  Outcome: Not Progressing  3/8/2025 2021 by Celeste Ordonez RN  Outcome: Not Progressing  Goal: Absence of Hospital-Acquired Illness or Injury  3/9/2025 0611 by Celeste Ordonez RN  Outcome: Not Progressing  3/8/2025 2021 by Celeste Ordonez, " RN  Outcome: Not Progressing  Intervention: Identify and Manage Fall Risk  Recent Flowsheet Documentation  Taken 3/8/2025 2018 by Celeste Ordonez RN  Safety Promotion/Fall Prevention:   safety round/check completed   nonskid shoes/slippers when out of bed  Intervention: Prevent Skin Injury  Recent Flowsheet Documentation  Taken 3/9/2025 0013 by Celeste Ordonez RN  Body Position: position changed independently  Taken 3/8/2025 2018 by Celeste Ordonez RN  Body Position: position changed independently  Intervention: Prevent Infection  Recent Flowsheet Documentation  Taken 3/8/2025 2018 by Celeste Ordonez RN  Infection Prevention:   rest/sleep promoted   hand hygiene promoted  Goal: Optimal Comfort and Wellbeing  3/9/2025 0611 by Celeste Ordonez RN  Outcome: Not Progressing  3/8/2025 2021 by Celeste Ordonez RN  Outcome: Not Progressing  Goal: Readiness for Transition of Care  3/9/2025 0611 by Celeste Ordonez RN  Outcome: Not Progressing  3/8/2025 2021 by Celeste Ordonez RN  Outcome: Not Progressing  Intervention: Mutually Develop Transition Plan  Recent Flowsheet Documentation  Taken 3/8/2025 2000 by Celeste Ordonez RN  Equipment Currently Used at Home: none     Problem: Nausea and Vomiting  Goal: Nausea and Vomiting Relief  3/9/2025 0611 by Celeste Ordonez RN  Outcome: Not Progressing  3/8/2025 2021 by Celeste Ordonez RN  Outcome: Not Progressing     Problem: Skin Injury Risk Increased  Goal: Skin Health and Integrity  3/9/2025 0611 by Celeste Ordonez RN  Outcome: Not Progressing  3/8/2025 2021 by Celeste Ordonez RN  Outcome: Not Progressing  Intervention: Plan: Nurse Driven Intervention: Moisture Management  Recent Flowsheet Documentation  Taken 3/9/2025 0013 by Celeste Ordonez RN  Bathing/Skin Care:   dressed/undressed   incontinence care  Intervention: Optimize Skin Protection  Recent Flowsheet Documentation  Taken 3/9/2025 0013 by Celeste Ordonez RN  Activity Management:   ambulated to  bathroom   back to bed  Head of Bed (HOB) Positioning: HOB at 20-30 degrees  Taken 3/8/2025 2018 by Celeste Ordonez, RN  Activity Management: activity adjusted per tolerance  Head of Bed (HOB) Positioning: HOB at 20-30 degrees

## 2025-03-09 NOTE — ANESTHESIA PROCEDURE NOTES
Airway       Patient location during procedure: OR       Procedure Start/Stop Times: 3/9/2025 1:54 PM  Staff -        Performed By: CRNA  Consent for Airway        Urgency: elective  Indications and Patient Condition       Indications for airway management: umair-procedural and airway protection       Induction type:RSI       Mask difficulty assessment: 0 - not attempted    Final Airway Details       Final airway type: endotracheal airway       Successful airway: ETT - single and Oral  Endotracheal Airway Details        ETT size (mm): 8.0       Cuffed: yes       Successful intubation technique: direct laryngoscopy       DL Blade Type: [place]o 2       Grade View of Cords: 1       Adjucts: stylet       Position: Right       Measured from: lips       Secured at (cm): 23       Bite block used: None    Post intubation assessment        Placement verified by: capnometry and equal breath sounds        Number of attempts at approach: 1       Number of other approaches attempted: 0       Secured with: tape       Ease of procedure: easy       Dentition: Intact    Medication(s) Administered   Medication Administration Time: 3/9/2025 1:54 PM

## 2025-03-09 NOTE — PROCEDURES
Brief gastroenterology procedure note    RESULTS (see official report under Chart Review//Procedures tab for details)    PERFORMED BY:  Otis Urrutia MD    INDICATION: Gastrointestinal bleeding  ASA SCORE:  III  SEDATION: General anesthesia  COMPLICATIONS:  None    FINDINGS:  -Large esophageal varices with red cristina signs in the nipple signed.  Banded x 6 incompletely eradicated the large amount and size of varices but the nipple sign was adequately banded  -Medium to small sized gastric varices mainly in the cardia with no stigmata of recent bleeding or signs of high risk of bleeding  -Portal hypertensive gastropathy  -Normal duodenum    RECOMMENDATIONS:  -NPO  -Continue octreotide and IV PPI  -Continue to monitor for signs of bleeding and contact us if he does bleed  -Follow hemoglobin and MELD labs daily  -Will evaluate him with you daily  -Will plan to repeat upper endoscopy as an outpatient to retreat remaining varices    Please contact us with any further questions    Otis Urrtuia MD

## 2025-03-09 NOTE — ANESTHESIA POSTPROCEDURE EVALUATION
Patient: Willi Lares    Procedure: Procedure(s):  ESOPHAGOGASTRODUODENOSCOPY, Esophageal Varices Banding x 6       Anesthesia Type:  General    Note:  Disposition: Inpatient   Postop Pain Control: Uneventful            Sign Out: Well controlled pain   PONV: No   Neuro/Psych: Uneventful            Sign Out: Acceptable/Baseline neuro status   Airway/Respiratory: Uneventful            Sign Out: Acceptable/Baseline resp. status   CV/Hemodynamics: Uneventful            Sign Out: Acceptable CV status   Other NRE: NONE   DID A NON-ROUTINE EVENT OCCUR? No           Last vitals:  Vitals Value Taken Time   /55 03/09/25 1435   Temp 99.2  F (37.3  C) 03/09/25 1451   Pulse 78 03/09/25 1451   Resp 14 03/09/25 1451   SpO2 98 % 03/09/25 1451   Vitals shown include unfiled device data.    Electronically Signed By: Parvez Newton MD  March 9, 2025  2:53 PM

## 2025-03-09 NOTE — PROGRESS NOTES
[facility]    Medicine Progress Note - Hospitalist Service    Date of Admission:  3/8/2025    Assessment & Plan   Willi Lares is a 30 year old male admitted on 3/8/2025. He has medical history significant for azoospermia, family history of polycystic kidney disease.  He presented to the ED with complaints of vomiting blood and also having dark watery bowel movements.  CT imaging showing new cirrhosis, polycystic kidneys, and evidence of varices.  Hemoglobin declining during admission, receiving 1 unit PRBCs.  GI consulted, undergoing EGD 3/9.  Also noted to have innumerable benign cysts in bilateral kidneys, consulted nephrology for initial assessment of this new diagnosis as well.    Upper GI bleed  Esophageal varices  Hematemesis  Presented with episodes of hematemesis and frequent dark stools at home.  CT imaging showed evidence of cirrhosis and esophageal varices.  Hemoglobin declined to 7.5 from 10.3 on admission.  Has continued to have dark stools.  GI consulted, urgent endoscopy to be done 3/9.  Was started on octreotide, fluids, twice daily IV PPI.  - [facility] consulted, appreciate assistance  - EGD 3/9, follow-up results  - IV PPI twice daily  - Continue octreotide    Cirrhosis  New diagnosis.  CT and ultrasound imaging consistent with cirrhosis.  No clear risk factors, denies heavy alcohol use.  Does have polycystic kidney disease in the family, but no visible cysts on liver on imaging.  Viral and autoimmune workup per GI.  - Follow-up viral, autoimmune workup for cirrhosis    Polycystic kidney disease  Family history of polycystic kidney disease  Patient reports that mother and one of the relative have polycystic kidney disease.  He had never been diagnosed with this condition to this point in his life.  CT showed innumerable bilateral benign renal cysts.  Kidney function and electrolytes are normal on admission labs.  Discussed findings on imaging with patient, who had never been  told he had PCKD in the past.  Consulting nephrology for assistance with initial workup and recommendations.  - Nephrology consulted, appreciate assistance  - Daily BMP            Diet: NPO for Medical/Clinical Reasons Except for: Meds, Ice Chips  DVT Prophylaxis: Deferred with active bleeding.  Kumari Catheter: Not present  Lines: None     Cardiac Monitoring: None  Code Status: Full Code      Clinically Significant Risk Factors Present on Admission          # Hyperchloremia: Highest Cl = 111 mmol/L in last 2 days, will monitor as appropriate      # Hypocalcemia: Lowest Ca = 7.9 mg/dL in last 2 days, will monitor and replace as appropriate        # Coagulation Defect: INR = 1.26 (Ref range: 0.85 - 1.15) and/or PTT = N/A, will monitor for bleeding  # Thrombocytopenia: Lowest platelets = 98 in last 2 days, will monitor for bleeding        # Anemia: based on hgb <11  # Anemia: based on hgb <11                  Social Drivers of Health            Disposition Plan     Medically Ready for Discharge: Anticipated in 2-4 Days             Jarret Foley MD  Hospitalist Service  [facility]  Securely message with [employer] (more info)  Text page via [employer] Paging/Directory   ______________________________________________________________________    Interval History   Overnight had a couple more episodes of dark stools.  No additional hematemesis.  Some discomfort in his abdomen but denies any pain.  Discussed findings of cysts on his kidneys, abnormalities of the liver.  Undergoing EGD later today.    Physical Exam   Vital Signs: Temp: 98.1  F (36.7  C) Temp src: Oral BP: 121/58 Pulse: 71   Resp: 16 SpO2: 99 % O2 Device: None (Room air) Oxygen Delivery: 5 LPM  Weight: 158 lbs 1.12 oz    General: Pleasant.  Wife and sister at bedside.  Cardiac: RRR.  No murmur. No rub or gallop.  Respiratory: CTAB, no crackles, wheezes, rales, or rhonchi  Abdomen: Soft, nondistended, mild discomfort in the epigastric and upper  left quadrants but not significantly worsened with palpation  Extremity: No LE edema, DP pulses palpable.   Neuro: AAO x3,   Psych: Calm and cooperative.     Medical Decision Making       45 MINUTES SPENT BY ME on the date of service doing chart review, history, exam, documentation & further activities per the note.      Data     I have personally reviewed the following data over the past 24 hrs:    6.0  \   7.6 (L)   / 98 (L)     141 111 (H) 35.0 (H) /  140 (H)   4.3 26 0.98 \     ALT: N/A AST: N/A AP: N/A TBILI: N/A   ALB: N/A TOT PROTEIN: N/A LIPASE: N/A       Imaging results reviewed over the past 24 hrs:   Recent Results (from the past 24 hours)   CTA Abdomen Pelvis with Contrast    Narrative    EXAM: CTA ABDOMEN PELVIS WITH CONTRAST  LOCATION: [facility]  DATE: 3/8/2025    INDICATION: Hematemesis. Melena.  COMPARISON: MRI pelvis 8/9/2024.  TECHNIQUE: CT angiogram abdomen pelvis during arterial phase of injection of IV contrast. 2D and 3D MIP reconstructions were performed by the CT technologist. Dose reduction techniques were used.  CONTRAST: 83mL isovue 370    FINDINGS:    ANGIOGRAM ABDOMEN/PELVIS: No evidence of active gastrointestinal bleeding. No abdominal aortic aneurysm. Celiac artery and its branches are patent. Superior mesenteric artery and its visualized branches are patent. Bilateral single renal arteries are   patent. Inferior mesenteric artery and its visualized branches are patent. Bilateral iliofemoral veins are patent. Portal, splenic, and superior mesenteric veins are patent. Extensive gastroesophageal varices. Multiple left upper quadrant varices   including a small splenorenal shunt. IVC is patent.    LOWER CHEST: Normal.    HEPATOBILIARY: Cirrhotic liver morphology. Tiny right hepatic cyst. No suspicious liver lesions. No calcified gallstones or biliary ductal dilation.    PANCREAS: Normal.    SPLEEN: Mild splenomegaly measuring 13.5 cm.    ADRENAL GLANDS:  Normal.    KIDNEYS/BLADDER: Innumerable benign cysts throughout both kidneys measuring up to 6.9 cm on the right and 6.3 cm on the left. Single nonobstructing 4 mm right lower pole renal calculus. Three nonobstructing left renal calculi measuring up to 4 mm. No   hydronephrosis. No ureteral calculi. Normal bladder.    BOWEL: No bowel obstruction or small bowel inflammation. Normal appendix. Mild wall thickening of the ascending and proximal transverse colon.    PERITONEUM/RETROPERITONEUM: Small amount of ascites. Mesenteric edema.    LYMPH NODES: No enlarged lymph nodes.    PELVIC ORGANS: Fluid distends both seminal vesicles. 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     MUSCULOSKELETAL: No acute bony abnormality or destructive bone lesions.      Impression    IMPRESSION:    1.  No evidence of active gastrointestinal bleeding.    2.  Cirrhotic liver with features of portal hypertension including splenomegaly, a small amount of ascites, and varices including extensive gastroesophageal varices.    3.  Mild wall thickening of the ascending and proximal transverse colon is favored to reflect portal colopathy, although a mild colitis would appear similar.    4.  Innumerable benign cysts throughout both kidneys, which do not require follow-up.    5.  Bilateral nonobstructing intrarenal calculi measuring up to 4 mm on both sides.    6.  Fluid distends both seminal vesicles, likely related to the recent transurethral resection of the ejaculatory ducts. No abscess.   US Abdomen Limited w Abd/Pelvis Duplex Complete    Narrative    EXAM: US ABDOMEN LIMITED WITH DOPPLER COMPLETE  LOCATION: [facility]  DATE: 3/8/2025    INDICATION: Liver cirrhosis  COMPARISON: None.  TECHNIQUE: Limited abdominal ultrasound. Color flow with spectral Doppler and waveform analysis performed.     FINDINGS:    GALLBLADDER: Borderline gallbladder wall thickening. No pericholecystic fluid. No cholelithiasis or bile sludge.  Negative sonographic Hicks sign.     BILE DUCTS: No biliary dilatation. The common duct measures 5 mm.    LIVER: Nodular hepatic surface contour. Coarsened hepatic echotexture. No focal mass.     RIGHT KIDNEY: Measures 14.5 cm in length.. Innumerable simple cysts in the kidney, measuring up to 7.9 cm; no follow-up indicated. No hydronephrosis or mass.     PANCREAS: The visualized portions are normal.    AORTA: Normal in caliber.    IVC: Normal where visualized.    Trace ascites.    ABDOMINAL DUPLEX: The hepatic artery, hepatic veins, IVC, portal veins, and splenic vein are patent with flow in the normal direction.       Impression    IMPRESSION:  1.  Morphologic changes of cirrhosis. Trace ascites.  2.  Normal Doppler evaluation of the hepatic vasculature.  3.  Mild gallbladder wall thickening, nonspecific and may be related to cirrhosis. No cholelithiasis or biliary dilatation.

## 2025-03-09 NOTE — ANESTHESIA CARE TRANSFER NOTE
Patient: Willi Lares    Procedure: Procedure(s):  ESOPHAGOGASTRODUODENOSCOPY, Esophageal Varices Banding x 6       Diagnosis: Upper GI bleed [K92.2]  Cirrhosis of liver with ascites, unspecified hepatic cirrhosis type (H) [K74.60, R18.8]  Diagnosis Additional Information: No value filed.    Anesthesia Type:   General     Note:    Oropharynx: spontaneously breathing  Level of Consciousness: awake  Oxygen Supplementation: face mask    Independent Airway: airway patency satisfactory and stable  Dentition: dentition unchanged  Vital Signs Stable: post-procedure vital signs reviewed and stable  Report to RN Given: handoff report given  Patient transferred to: PACU    Handoff Report: Identifed the Patient, Identified the Reponsible Provider, Reviewed the pertinent medical history, Discussed the surgical course, Reviewed Intra-OP anesthesia mangement and issues during anesthesia, Set expectations for post-procedure period and Allowed opportunity for questions and acknowledgement of understanding      Vitals:  Vitals Value Taken Time   /70 03/09/25 1422   Temp 98.5  F (36.9  C) 03/09/25 1422   Pulse 80 03/09/25 1424   Resp 9 03/09/25 1424   SpO2 100 % 03/09/25 1424   Vitals shown include unfiled device data.    Electronically Signed By: NANCY Covarrubias CRNA  March 9, 2025  2:25 PM

## 2025-03-09 NOTE — ANESTHESIA PREPROCEDURE EVALUATION
Anesthesia Pre-Procedure Evaluation    Patient: Willi Lares   MRN: [id number] : 1995        Procedure : Procedure(s):  ESOPHAGOGASTRODUODENOSCOPY          History reviewed. No pertinent past medical history.   Past Surgical History:   Procedure Laterality Date    CYSTOSCOPY, TRANSURETHRAL RESECTION (TUR) EJACULATORY DUCT(S) Bilateral 11/15/2024    Procedure: CYSTOURETHROSCOPY, WITH TRANSURETHRAL RESECTION OF  EJACULATORY DUCTS BILATERAL;  Surgeon: Nahun Hartmann MD;  Location: [facility] OR    TRANSRECTAL ULTRASONIC SONOGRAM N/A 11/15/2024    Procedure: transrectal ultrasound, inject seminal vesicle with methylene blue.;  Surgeon: Nahun Hartmann MD;  Location: [facility] OR      No Known Allergies   Social History     Tobacco Use    Smoking status: Never    Smokeless tobacco: Never   Substance Use Topics    Alcohol use: Not on file      Wt Readings from Last 1 Encounters:   25 71.7 kg (158 lb 1.1 oz)        Anesthesia Evaluation            ROS/MED HX  ENT/Pulmonary:  - neg pulmonary ROS     Neurologic:  - neg neurologic ROS     Cardiovascular:  - neg cardiovascular ROS     METS/Exercise Tolerance:     Hematologic:     (+)      anemia (Acute blood loss anemia, transfusion), history of blood transfusion, no previous transfusion reaction, Known PRBC Anitbodies:No       Musculoskeletal:  - neg musculoskeletal ROS     GI/Hepatic: Comment: Upper GI bleed    (+)   esophageal disease, Varices,         liver disease (cirrhosis),       Renal/Genitourinary:       Endo:  - neg endo ROS     Psychiatric/Substance Use:  - neg psychiatric ROS     Infectious Disease:       Malignancy:       Other:            Physical Exam    Airway        Mallampati: II   TM distance: > 3 FB   Neck ROM: full   Mouth opening: > 3 cm    Respiratory Devices and Support         Dental       (+) Minor Abnormalities - some fillings, tiny chips      Cardiovascular          Rhythm and rate: regular and normal     Pulmonary           breath  "sounds clear to auscultation           OUTSIDE LABS:  CBC:   Lab Results   Component Value Date    WBC 6.0 03/09/2025    WBC 8.9 03/08/2025    HGB 7.6 (L) 03/09/2025    HGB 7.5 (L) 03/09/2025    HCT 22.2 (L) 03/09/2025    HCT 30.9 (L) 03/08/2025    PLT 98 (L) 03/09/2025     03/08/2025     BMP:   Lab Results   Component Value Date     03/09/2025     03/08/2025    POTASSIUM 4.3 03/09/2025    POTASSIUM 3.5 03/08/2025    CHLORIDE 111 (H) 03/09/2025    CHLORIDE 107 03/08/2025    CO2 26 03/09/2025    CO2 24 03/08/2025    BUN 35.0 (H) 03/09/2025    BUN 39.5 (H) 03/08/2025    CR 0.98 03/09/2025    CR 0.74 03/08/2025     (H) 03/09/2025     (H) 03/08/2025     COAGS:   Lab Results   Component Value Date    INR 1.26 (H) 03/08/2025     POC: No results found for: \"BGM\", \"HCG\", \"HCGS\"  HEPATIC:   Lab Results   Component Value Date    ALBUMIN 4.0 03/08/2025    PROTTOTAL 6.0 (L) 03/08/2025    ALT 35 03/08/2025    AST 32 03/08/2025    ALKPHOS 47 03/08/2025    BILITOTAL 1.0 03/08/2025     OTHER:   Lab Results   Component Value Date    LIN 7.9 (L) 03/09/2025       Anesthesia Plan    ASA Status:  3    NPO Status:  NPO Appropriate    Anesthesia Type: General.     - Airway: ETT   Induction: Intravenous, RSI.   Maintenance: Inhalation.        Consents    Anesthesia Plan(s) and associated risks, benefits, and realistic alternatives discussed. Questions answered and patient/representative(s) expressed understanding.     - Discussed:     - Discussed with:  Patient      - Extended Intubation/Ventilatory Support Discussed: No.      - Patient is DNR/DNI Status: No     Use of blood products discussed: No .     Postoperative Care    Pain management: IV analgesics, Oral pain medications, Multi-modal analgesia.   PONV prophylaxis: Dexamethasone or Solumedrol, Ondansetron (or other 5HT-3)     Comments:               Parvez Newton MD      Clinically Significant Risk Factors Present on Admission          # " Hyperchloremia: Highest Cl = 111 mmol/L in last 2 days, will monitor as appropriate      # Hypocalcemia: Lowest Ca = 7.9 mg/dL in last 2 days, will monitor and replace as appropriate      # Coagulation Defect: INR = 1.26 (Ref range: 0.85 - 1.15) and/or PTT = N/A, will monitor for bleeding  # Thrombocytopenia: Lowest platelets = 98 in last 2 days, will monitor for bleeding        # Anemia: based on hgb <11  # Anemia: based on hgb <11

## 2025-03-09 NOTE — PLAN OF CARE
"Goal Outcome Evaluation:      Plan of Care Reviewed With: patient    Overall Patient Progress: no changeOverall Patient Progress: no change    Outcome Evaluation: VSS. Denies pain. BS hyperactive. bloody stools x2. Hgb 7.5 1 unit of blood given. EGD done today. NPO. NS and Ocreotide infusing. SBA to bathroom. Plan is to monitor bleeding, keep patient NPO for repeat EGD. Nephrology consulted.    Problem: Adult Inpatient Plan of Care  Goal: Plan of Care Review  Description: The Plan of Care Review/Shift note should be completed every shift.  The Outcome Evaluation is a brief statement about your assessment that the patient is improving, declining, or no change.  This information will be displayed automatically on your shift  note.  Outcome: Progressing  Flowsheets (Taken 3/9/2025 1830)  Outcome Evaluation: VSS. Denies pain. BS hyperactive. bloody stools x2. Hgb 7.5 1 unit of blood given. EGD done today. NPO. NS and Ocreotide infusing. SBA to bathroom.  Plan of Care Reviewed With: patient  Overall Patient Progress: no change  Goal: Patient-Specific Goal (Individualized)  Description: You can add care plan individualizations to a care plan. Examples of Individualization might be:  \"Parent requests to be called daily at 9am for status\", \"I have a hard time hearing out of my right ear\", or \"Do not touch me to wake me up as it startles  me\".  Outcome: Progressing  Goal: Absence of Hospital-Acquired Illness or Injury  Outcome: Progressing  Intervention: Identify and Manage Fall Risk  Recent Flowsheet Documentation  Taken 3/9/2025 0834 by Tere Baer, RN  Safety Promotion/Fall Prevention:   activity supervised   clutter free environment maintained   nonskid shoes/slippers when out of bed   patient and family education   safety round/check completed   supervised activity   toileting scheduled  Intervention: Prevent Skin Injury  Recent Flowsheet Documentation  Taken 3/9/2025 0834 by Tere Baer, RN  Body Position: position " changed independently  Goal: Optimal Comfort and Wellbeing  Outcome: Progressing  Goal: Readiness for Transition of Care  Outcome: Progressing     Problem: Nausea and Vomiting  Goal: Nausea and Vomiting Relief  Outcome: Progressing     Problem: Skin Injury Risk Increased  Goal: Skin Health and Integrity  Outcome: Progressing  Intervention: Plan: Nurse Driven Intervention: Moisture Management  Recent Flowsheet Documentation  Taken 3/9/2025 0800 by Tere Baer, RN  Moisture Interventions:   Encourage regular toileting   Incontinence pad   Body-worn absorptive product when OOB (brief, etc.)  Bathing/Skin Care: (Continent) other (see comments)  Intervention: Optimize Skin Protection  Recent Flowsheet Documentation  Taken 3/9/2025 0834 by Tere Baer, RN  Activity Management: activity adjusted per tolerance  Head of Bed (HOB) Positioning:   HOB at 20-30 degrees   HOB lowered

## 2025-03-10 LAB
ALBUMIN SERPL BCG-MCNC: 3.2 G/DL (ref 3.5–5.2)
ALP SERPL-CCNC: 33 U/L (ref 40–150)
ALT SERPL W P-5'-P-CCNC: 30 U/L (ref 0–70)
ANION GAP SERPL CALCULATED.3IONS-SCNC: 6 MMOL/L (ref 7–15)
AST SERPL W P-5'-P-CCNC: 27 U/L (ref 0–45)
ATRIAL RATE - MUSE: 74 BPM
BILIRUB SERPL-MCNC: 0.7 MG/DL
BUN SERPL-MCNC: 28.2 MG/DL (ref 6–20)
CALCIUM SERPL-MCNC: 7.9 MG/DL (ref 8.8–10.4)
CERULOPLASMIN SERPL-MCNC: 20 MG/DL (ref 20–60)
CHLORIDE SERPL-SCNC: 110 MMOL/L (ref 98–107)
CREAT SERPL-MCNC: 0.83 MG/DL (ref 0.67–1.17)
DIASTOLIC BLOOD PRESSURE - MUSE: NORMAL MMHG
EGFRCR SERPLBLD CKD-EPI 2021: >90 ML/MIN/1.73M2
ERYTHROCYTE [DISTWIDTH] IN BLOOD BY AUTOMATED COUNT: 14.1 % (ref 10–15)
GLUCOSE SERPL-MCNC: 121 MG/DL (ref 70–99)
HCO3 SERPL-SCNC: 26 MMOL/L (ref 22–29)
HCT VFR BLD AUTO: 22.5 % (ref 40–53)
HGB BLD-MCNC: 7.5 G/DL (ref 13.3–17.7)
HGB BLD-MCNC: 7.5 G/DL (ref 13.3–17.7)
HGB BLD-MCNC: 7.7 G/DL (ref 13.3–17.7)
HGB BLD-MCNC: 7.7 G/DL (ref 13.3–17.7)
INTERPRETATION ECG - MUSE: NORMAL
IRON BINDING CAPACITY (ROCHE): 195 UG/DL (ref 240–430)
IRON SATN MFR SERPL: 15 % (ref 15–46)
IRON SERPL-MCNC: 30 UG/DL (ref 61–157)
MCH RBC QN AUTO: 30.5 PG (ref 26.5–33)
MCHC RBC AUTO-ENTMCNC: 33.8 G/DL (ref 31.5–36.5)
MCV RBC AUTO: 91 FL (ref 78–100)
P AXIS - MUSE: 41 DEGREES
PLATELET # BLD AUTO: 85 10E3/UL (ref 150–450)
POTASSIUM SERPL-SCNC: 4.4 MMOL/L (ref 3.4–5.3)
PR INTERVAL - MUSE: 140 MS
PROT SERPL-MCNC: 5 G/DL (ref 6.4–8.3)
QRS DURATION - MUSE: 90 MS
QT - MUSE: 388 MS
QTC - MUSE: 430 MS
R AXIS - MUSE: 29 DEGREES
RBC # BLD AUTO: 2.49 10E6/UL (ref 4.4–5.9)
SODIUM SERPL-SCNC: 142 MMOL/L (ref 135–145)
SYSTOLIC BLOOD PRESSURE - MUSE: NORMAL MMHG
T AXIS - MUSE: 41 DEGREES
VENTRICULAR RATE- MUSE: 74 BPM
WBC # BLD AUTO: 5.5 10E3/UL (ref 4–11)

## 2025-03-10 PROCEDURE — 36415 COLL VENOUS BLD VENIPUNCTURE: CPT | Performed by: STUDENT IN AN ORGANIZED HEALTH CARE EDUCATION/TRAINING PROGRAM

## 2025-03-10 PROCEDURE — 84460 ALANINE AMINO (ALT) (SGPT): CPT | Performed by: STUDENT IN AN ORGANIZED HEALTH CARE EDUCATION/TRAINING PROGRAM

## 2025-03-10 PROCEDURE — 86682 HELMINTH ANTIBODY: CPT | Performed by: STUDENT IN AN ORGANIZED HEALTH CARE EDUCATION/TRAINING PROGRAM

## 2025-03-10 PROCEDURE — 82103 ALPHA-1-ANTITRYPSIN TOTAL: CPT | Performed by: STUDENT IN AN ORGANIZED HEALTH CARE EDUCATION/TRAINING PROGRAM

## 2025-03-10 PROCEDURE — 86038 ANTINUCLEAR ANTIBODIES: CPT | Performed by: STUDENT IN AN ORGANIZED HEALTH CARE EDUCATION/TRAINING PROGRAM

## 2025-03-10 PROCEDURE — 99232 SBSQ HOSP IP/OBS MODERATE 35: CPT | Performed by: STUDENT IN AN ORGANIZED HEALTH CARE EDUCATION/TRAINING PROGRAM

## 2025-03-10 PROCEDURE — 120N000001 HC R&B MED SURG/OB

## 2025-03-10 PROCEDURE — 85018 HEMOGLOBIN: CPT | Performed by: STUDENT IN AN ORGANIZED HEALTH CARE EDUCATION/TRAINING PROGRAM

## 2025-03-10 PROCEDURE — 84155 ASSAY OF PROTEIN SERUM: CPT | Performed by: STUDENT IN AN ORGANIZED HEALTH CARE EDUCATION/TRAINING PROGRAM

## 2025-03-10 PROCEDURE — 250N000011 HC RX IP 250 OP 636: Mod: JA | Performed by: EMERGENCY MEDICINE

## 2025-03-10 PROCEDURE — 86706 HEP B SURFACE ANTIBODY: CPT | Performed by: STUDENT IN AN ORGANIZED HEALTH CARE EDUCATION/TRAINING PROGRAM

## 2025-03-10 PROCEDURE — 250N000011 HC RX IP 250 OP 636: Performed by: INTERNAL MEDICINE

## 2025-03-10 PROCEDURE — 87340 HEPATITIS B SURFACE AG IA: CPT | Performed by: STUDENT IN AN ORGANIZED HEALTH CARE EDUCATION/TRAINING PROGRAM

## 2025-03-10 PROCEDURE — 82728 ASSAY OF FERRITIN: CPT | Performed by: STUDENT IN AN ORGANIZED HEALTH CARE EDUCATION/TRAINING PROGRAM

## 2025-03-10 PROCEDURE — 258N000003 HC RX IP 258 OP 636: Performed by: INTERNAL MEDICINE

## 2025-03-10 PROCEDURE — 86364 TISS TRNSGLTMNASE EA IG CLAS: CPT | Performed by: STUDENT IN AN ORGANIZED HEALTH CARE EDUCATION/TRAINING PROGRAM

## 2025-03-10 PROCEDURE — 86704 HEP B CORE ANTIBODY TOTAL: CPT | Performed by: STUDENT IN AN ORGANIZED HEALTH CARE EDUCATION/TRAINING PROGRAM

## 2025-03-10 PROCEDURE — 83550 IRON BINDING TEST: CPT | Performed by: STUDENT IN AN ORGANIZED HEALTH CARE EDUCATION/TRAINING PROGRAM

## 2025-03-10 PROCEDURE — 83540 ASSAY OF IRON: CPT | Performed by: STUDENT IN AN ORGANIZED HEALTH CARE EDUCATION/TRAINING PROGRAM

## 2025-03-10 PROCEDURE — 258N000003 HC RX IP 258 OP 636: Performed by: EMERGENCY MEDICINE

## 2025-03-10 PROCEDURE — 99254 IP/OBS CNSLTJ NEW/EST MOD 60: CPT | Performed by: INTERNAL MEDICINE

## 2025-03-10 RX ADMIN — SODIUM CHLORIDE: 0.9 INJECTION, SOLUTION INTRAVENOUS at 22:39

## 2025-03-10 RX ADMIN — OCTREOTIDE ACETATE 50 MCG/HR: 200 INJECTION, SOLUTION INTRAVENOUS; SUBCUTANEOUS at 17:14

## 2025-03-10 RX ADMIN — SODIUM CHLORIDE: 0.9 INJECTION, SOLUTION INTRAVENOUS at 16:23

## 2025-03-10 RX ADMIN — CEFTRIAXONE 2 G: 2 INJECTION, POWDER, FOR SOLUTION INTRAMUSCULAR; INTRAVENOUS at 16:23

## 2025-03-10 RX ADMIN — PANTOPRAZOLE SODIUM 40 MG: 40 INJECTION, POWDER, FOR SOLUTION INTRAVENOUS at 08:56

## 2025-03-10 RX ADMIN — SODIUM CHLORIDE: 0.9 INJECTION, SOLUTION INTRAVENOUS at 01:48

## 2025-03-10 RX ADMIN — PANTOPRAZOLE SODIUM 40 MG: 40 INJECTION, POWDER, FOR SOLUTION INTRAVENOUS at 19:46

## 2025-03-10 RX ADMIN — SODIUM CHLORIDE: 0.9 INJECTION, SOLUTION INTRAVENOUS at 11:57

## 2025-03-10 ASSESSMENT — ACTIVITIES OF DAILY LIVING (ADL)
ADLS_ACUITY_SCORE: 39
ADLS_ACUITY_SCORE: 36
ADLS_ACUITY_SCORE: 39
ADLS_ACUITY_SCORE: 36
ADLS_ACUITY_SCORE: 39
ADLS_ACUITY_SCORE: 39
ADLS_ACUITY_SCORE: 36
ADLS_ACUITY_SCORE: 39

## 2025-03-10 NOTE — PROGRESS NOTES
[facility]    Medicine Progress Note - Hospitalist Service    Date of Admission:  3/8/2025    Assessment & Plan   Willi Lares is a 30 year old male admitted on 3/8/2025. He has medical history significant for azoospermia, family history of polycystic kidney disease.  He presented to the ED with complaints of vomiting blood and also having dark watery bowel movements.  CT imaging showing new cirrhosis, polycystic kidneys, and evidence of varices.  Hemoglobin declining during admission, receiving 1 unit PRBCs.  GI consulted, undergoing EGD 3/9.  Also noted to have innumerable benign cysts in bilateral kidneys, consulted nephrology for initial assessment of this new diagnosis as well.    Upper GI bleed  Gastroesophageal varices s/p banding  Hematemesis  Presented with episodes of hematemesis and frequent dark stools at home.  CT imaging showed evidence of cirrhosis and esophageal varices.  Hemoglobin declined to 7.5 from 10.3 on admission.  Has continued to have dark stools.  GI consulted, urgent endoscopy to be done 3/9.  Was started on octreotide, fluids, twice daily IV PPI.  - [facility] consulted, appreciate assistance  - EGD 3/9 with large varices, multiple banded but will need repeat EGD for additional management  - IV PPI twice daily  - Continue octreotide    Cirrhosis  New diagnosis.  CT and ultrasound imaging consistent with cirrhosis.  No clear risk factors, denies heavy alcohol use.  Does have polycystic kidney disease in the family, but no visible cysts on liver on imaging.  Viral and autoimmune workup per GI.  - Follow-up viral, autoimmune workup for cirrhosis  - [facility] following, appreciate assistance    Polycystic kidney disease  Family history of polycystic kidney disease  Patient reports that mother and one of the relative have polycystic kidney disease.  He had never been diagnosed with this condition to this point in his life.  CT showed innumerable bilateral benign renal cysts.   Kidney function and electrolytes are normal on admission labs.  Discussed findings on imaging with patient, who had never been told he had PCKD in the past.  Consulting nephrology for assistance with initial workup and recommendations.  - Nephrology consulted, appreciate assistance  - Daily BMP            Diet: NPO for Medical/Clinical Reasons Except for: Meds, Ice Chips  DVT Prophylaxis: Deferred with active bleeding.  Kumari Catheter: Not present  Lines: None     Cardiac Monitoring: None  Code Status: Full Code      Clinically Significant Risk Factors          # Hyperchloremia: Highest Cl = 111 mmol/L in last 2 days, will monitor as appropriate      # Hypocalcemia: Lowest Ca = 7.9 mg/dL in last 2 days, will monitor and replace as appropriate     # Hypoalbuminemia: Lowest albumin = 3.2 g/dL at 3/10/2025  7:34 AM, will monitor as appropriate    # Coagulation Defect: INR = 1.26 (Ref range: 0.85 - 1.15) and/or PTT = N/A, will monitor for bleeding  # Thrombocytopenia: Lowest platelets = 85 in last 2 days, will monitor for bleeding                         Social Drivers of Health            Disposition Plan     Medically Ready for Discharge: Anticipated in 2-4 Days             Jarret Foley MD  Hospitalist Service  [facility]  Securely message with [employer] (more info)  Text page via [employer] Paging/Directory   ______________________________________________________________________    Interval History   No acute events overnight.  Has not had frequent bowel movements, but does report 1 small, dark bowel movement this morning.  Mild abdominal discomfort but otherwise feeling okay.    Physical Exam   Vital Signs: Temp: 98.2  F (36.8  C) Temp src: Oral BP: 110/59 Pulse: 67   Resp: 16 SpO2: 100 % O2 Device: None (Room air)    Weight: 158 lbs 1.12 oz    General: Pleasant.  Sitting in bed, no acute distress  Cardiac: RRR.  No murmur. No rub or gallop.  Respiratory: CTAB, no crackles, wheezes, rales, or  rhonchi  Abdomen: Soft, nondistended, mild discomfort in the epigastric and upper left quadrants but not significantly worsened with palpation  Extremity: No LE edema, DP pulses palpable.   Neuro: AAO x3,   Psych: Calm and cooperative.     Medical Decision Making       45 MINUTES SPENT BY ME on the date of service doing chart review, history, exam, documentation & further activities per the note.      Data     I have personally reviewed the following data over the past 24 hrs:    5.5  \   7.5 (L)   / 85 (L)     142 110 (H) 28.2 (H) /  121 (H)   4.4 26 0.83 \     ALT: 30 AST: 27 AP: 33 (L) TBILI: 0.7   ALB: 3.2 (L) TOT PROTEIN: 5.0 (L) LIPASE: N/A       Imaging results reviewed over the past 24 hrs:   No results found for this or any previous visit (from the past 24 hours).

## 2025-03-10 NOTE — CONSULTS
[facility]    RENAL CONSULTATION NOTE    REFERRING MD:  Dr. Foley    REASON FOR CONSULTATION:  cystic kidney dz    DATE OF CONSULTATION: 03/10/25    SHORTHAND KEY FOR MY NOTES:  c = with, s = without, p = after, a = before, x = except, asx = asymptomatic, tx = transplant or treatment, sx = symptoms or symptomatic, cx = canceled or culture, rxn = reaction, yday = yesterday, nl = normal, abx = antibiotics, fxn = function, dx = diagnosis, dz = disease, m/h = melena/hematochezia, c/d/l/ha = cramping/dizziness/lightheadedness/headache, d/c = discharge or diarrhea/constipation, f/c/n/v = fevers/chills/nausea/vomiting, cp/sob = chest pain/shortness of breath, tbv = total body volume, rxn = reaction, tdc = tunneled dialysis catheter, pta = prior to admission, hd = hemodialysis, pd = peritoneal dialysis, hhd = home hemodialysis, edw = estimated dry wt    HPI: Willi Lares is a 30 year old male c no previously known medical hx who was admitted on 3/8/2025 c hematemesis and melena and was found to have cirrhosis of the liver and polycystic kidney dz.  Notes from Michael Dickey (ER) and [facility] (Hosp) were reviewed.    Pt developed dark stools in the early morning of admission and then had multiple bouts of hematemesis that was coffee-ground and then dark red in color.  He was a bit dizzy so he presented to the ER for further eval.    In the ER, a CTA of the abd was performed and showed cirrhosis of the liver, polycystic kidneys, and kidney stones.    Pt has not had any episodes of severe abd pain, gross hematuria, recurrent UTIs.    ROS:  A complete review of systems was performed and is x as noted above.    PMH:    History reviewed. No pertinent past medical history.  PSH:    Past Surgical History:   Procedure Laterality Date    CYSTOSCOPY, TRANSURETHRAL RESECTION (TUR) EJACULATORY DUCT(S) Bilateral 11/15/2024    Procedure: CYSTOURETHROSCOPY, WITH TRANSURETHRAL RESECTION OF  EJACULATORY DUCTS  BILATERAL;  Surgeon: Nahun Hartmann MD;  Location: [facility] OR    ESOPHAGOSCOPY, GASTROSCOPY, DUODENOSCOPY (EGD), COMBINED N/A 3/9/2025    Procedure: ESOPHAGOGASTRODUODENOSCOPY, Esophageal Varices Banding x 6;  Surgeon: Otis Degroot MD;  Location:  OR    TRANSRECTAL ULTRASONIC SONOGRAM N/A 11/15/2024    Procedure: transrectal ultrasound, inject seminal vesicle with methylene blue.;  Surgeon: aNhun Hartmann MD;  [place] [facility] [place]     MEDICATIONS:    Current Facility-Administered Medications   Medication Dose Route Frequency Provider Last Rate Last Admin    cefTRIAXone (ROCEPHIN) 2 g vial to attach to  ml bag for ADULTS or NS 50 ml bag for PEDS  2 g Intravenous Q24H Will Escalona MD   2 g at 03/10/25 1623    pantoprazole (PROTONIX) IV push injection 40 mg  40 mg Intravenous BID Will Escalona MD   40 mg at 03/10/25 0856    sodium chloride (PF) 0.9% PF flush 3 mL  3 mL Intracatheter Q8H Will Escalona MD   3 mL at 03/10/25 0855     ALLERGIES:    Allergies as of 03/08/2025    (No Known Allergies)     FH:    History reviewed. No pertinent family history.  Mother, half-sister (same mother) - PCKD  SH:    Social History     Socioeconomic History    Marital status:      Spouse name: Not on file    Number of children: Not on file    Years of education: Not on file    Highest education level: Not on file   Occupational History    Not on file   Tobacco Use    Smoking status: Never    Smokeless tobacco: Never   Substance and Sexual Activity    Alcohol use: Not on file    Drug use: Not on file    Sexual activity: Not on file   Other Topics Concern    Not on file   Social History Narrative    Not on file     Social Drivers of Health     Financial Resource Strain: Low Risk  (3/8/2025)    Financial Resource Strain     Within the past 12 months, have you or your family members you live with been unable to get utilities (heat, electricity) when it was really needed?: No   Food Insecurity: Low  Risk  (3/8/2025)    Food Insecurity     Within the past 12 months, did you worry that your food would run out before you got money to buy more?: No     Within the past 12 months, did the food you bought just not last and you didn t have money to get more?: No   Transportation Needs: Low Risk  (3/8/2025)    Transportation Needs     Within the past 12 months, has lack of transportation kept you from medical appointments, getting your medicines, non-medical meetings or appointments, work, or from getting things that you need?: No   Physical Activity: Not on file   Stress: Not on file   Social Connections: Not on file   Interpersonal Safety: Low Risk  (3/8/2025)    Interpersonal Safety     Do you feel physically and emotionally safe where you currently live?: Yes     Within the past 12 months, have you been hit, slapped, kicked or otherwise physically hurt by someone?: No     Within the past 12 months, have you been humiliated or emotionally abused in other ways by your partner or ex-partner?: No   Housing Stability: Low Risk  (3/8/2025)    Housing Stability     Do you have housing? : Yes     Are you worried about losing your housing?: No     PHYSICAL EXAM:    /68 (BP Location: Right arm)   Pulse 73   Temp 98.5  F (36.9  C) (Oral)   Resp 16   Ht 1.829 m (6')   Wt 71.7 kg (158 lb 1.1 oz)   SpO2 98%   BMI 21.44 kg/m      GENERAL: awake, alert, NAD  HEENT:  normocephalic  CV: RRR, nl S1/S2; no ble edema  RESP: CTA B c good efforts  GI: abd s/nt/nd  MUSCULOSKELETAL: extremities nl - no gross deformities noted  SKIN: no suspicious lesions or rashes, dry to touch  NEURO:  strength normal and symmetric  PSYCH: mood good, affect appropriate  LINES:  + PIV    LABS:      CBC RESULTS:     Recent Labs   Lab 03/10/25  1424 03/10/25  0734 03/09/25  2204 03/09/25  1210 03/09/25  0807 03/09/25  0010 03/08/25  1722 03/08/25  1400   WBC  --  5.5  --   --  6.0  --   --  8.9   RBC  --  2.49*  --   --  2.48*  --   --  3.44*    HGB 7.5* 7.7*  7.7* 8.4* 7.6* 7.5* 9.0*   < > 10.3*   HCT  --  22.5*  --   --  22.2*  --   --  30.9*   PLT  --  85*  --   --  98*  --   --  151    < > = values in this interval not displayed.     BMP RESULTS:  Recent Labs   Lab 03/10/25  0734 03/09/25  0807 03/08/25  1400    141 140   POTASSIUM 4.4 4.3 3.5   CHLORIDE 110* 111* 107   CO2 26 26 24   BUN 28.2* 35.0* 39.5*   CR 0.83 0.98 0.74   * 140* 150*   LIN 7.9* 7.9* 8.3*     INR  Recent Labs   Lab 03/08/25  1400   INR 1.26*      DIAGNOSTICS:  Personally reviewed - abd CT - looks like PCKD    A/P:  Willi Lares is a 30 year old male c PCKD, kidney stones, and cirrhosis of the liver of unclear etiology.    1.  Polycystic kidney dz.  Pt has a strong family hx on his mother's side for ADPKD and the imaging looks like he has the same.  He does not have obvious liver cysts.    A.  Will need follow-up in our office.    2.  Liver cirrhosis of unclear etiology.  GI has seen the pt and he is undergoing a workup.  He traveled to [place] but didn't go to other  countries, but should be checked for schistosomiasis.  A.  Follow clinically.  B.  Discussed that if he wants a second opinion he should go to [place].    3.  Anemia.  Hb remains stable in the 7s.    A.  Plans per GI.  B.  Follow hb, clinically.    4.  FEN.  Electrolytes are ok.  Ca corrects for low alb.  A.  Reg diet.    Thank you for this consultation. We will follow c you.  Please call if any questions.      Case d/w Dr. Foley, pt/family.  Attestation:   I have reviewed today's relevant vital signs, notes, medications, labs and imaging.    Manuel Gray MD  [facility] - Nephrology  [phone]

## 2025-03-10 NOTE — PLAN OF CARE
"Goal Outcome Evaluation:      Plan of Care Reviewed With: patient  /59 (BP Location: Right arm)   Pulse 67   Temp 98.2  F (36.8  C) (Oral)   Resp 16   Ht 1.829 m (6')   Wt 71.7 kg (158 lb 1.1 oz)   SpO2 100%   BMI 21.44 kg/m     Problem: Adult Inpatient Plan of Care  Goal: Plan of Care Review  Description: The Plan of Care Review/Shift note should be completed every shift.  The Outcome Evaluation is a brief statement about your assessment that the patient is improving, declining, or no change.  This information will be displayed automatically on your shift  note.  Outcome: Not Progressing  Flowsheets (Taken 3/10/2025 1343)  Outcome Evaluation: pt A&O, has 2 loose dark brown stools today, NPO with ice chips only, awaiting for surgery  Plan of Care Reviewed With: patient  Goal: Patient-Specific Goal (Individualized)  Description: You can add care plan individualizations to a care plan. Examples of Individualization might be:  \"Parent requests to be called daily at 9am for status\", \"I have a hard time hearing out of my right ear\", or \"Do not touch me to wake me up as it startles  me\".  Outcome: Not Progressing  Goal: Absence of Hospital-Acquired Illness or Injury  Outcome: Not Progressing  Intervention: Identify and Manage Fall Risk  Recent Flowsheet Documentation  Taken 3/10/2025 1200 by Kaylee eMnendez RN  Safety Promotion/Fall Prevention: safety round/check completed  Intervention: Prevent Skin Injury  Recent Flowsheet Documentation  Taken 3/10/2025 1200 by Kaylee Menendez RN  Body Position: position changed independently  Intervention: Prevent and Manage VTE (Venous Thromboembolism) Risk  Recent Flowsheet Documentation  Taken 3/10/2025 1200 by Kaylee Menendez RN  VTE Prevention/Management: SCDs off (sequential compression devices)  Intervention: Prevent Infection  Recent Flowsheet Documentation  Taken 3/10/2025 1200 by Kaylee Menendez RN  Infection Prevention:   single patient room " provided   rest/sleep promoted   hand hygiene promoted  Goal: Optimal Comfort and Wellbeing  Outcome: Not Progressing  Goal: Readiness for Transition of Care  Outcome: Not Progressing     Problem: Nausea and Vomiting  Goal: Nausea and Vomiting Relief  Outcome: Not Progressing     Problem: Skin Injury Risk Increased  Goal: Skin Health and Integrity  Outcome: Not Progressing  Intervention: Plan: Nurse Driven Intervention: Moisture Management  Recent Flowsheet Documentation  Taken 3/10/2025 1200 by Kaylee Menendez RN  Moisture Interventions: Encourage regular toileting  Bathing/Skin Care: other (see comments)  Intervention: Optimize Skin Protection  Recent Flowsheet Documentation  Taken 3/10/2025 1200 by Kaylee Menendez RN  Activity Management:   activity adjusted per tolerance   ambulated to bathroom   back to bed  Head of Bed (HOB) Positioning: HOB at 20-30 degrees           Outcome Evaluation: pt A&O, has 2 loose dark brown stools today, NPO with ice chips only, awaiting for surgery

## 2025-03-10 NOTE — PROGRESS NOTES
GASTROENTEROLOGY PROGRESS NOTE     SUBJECTIVE:  Had some stool this morning he describes as chocolate syrup consistency. Denies abdominal pain. No further nausea, vomiting, or hematemesis.      OBJECTIVE:  /59 (BP Location: Right arm)   Pulse 67   Temp 98.2  F (36.8  C) (Oral)   Resp 16   Ht 1.829 m (6')   Wt 71.7 kg (158 lb 1.1 oz)   SpO2 100%   BMI 21.44 kg/m    Temp (24hrs), Av.5  F (36.9  C), Min:98.1  F (36.7  C), Max:99.2  F (37.3  C)    Patient Vitals for the past 72 hrs:   Weight   25 1826 71.7 kg (158 lb 1.1 oz)   25 1338 75.2 kg (165 lb 12.6 oz)       Intake/Output Summary (Last 24 hours) at 3/10/2025 1438  Last data filed at 3/10/2025 0700  Gross per 24 hour   Intake 1809 ml   Output 1900 ml   Net -91 ml        PHYSICAL EXAM  GENERAL: No obvious distress  EYES: No scleral icterus  ABDOMEN: Non-distended. Positive bowel sounds. Soft. Non-tender.  SKIN: No jaundice  NEUROLOGIC: Alert and oriented. No asterixis.   PSYCHIATRIC: Normal affect     Additional Comments:  ROS, FH, SH: See initial GI consult for details.     I have reviewed the patient's new clinical lab results:     Recent Labs   Lab Test 03/10/25  1424 03/10/25  0734 25  2204 25  1210 25  0807 25  1722 25  1400   WBC  --  5.5  --   --  6.0  --  8.9   HGB 7.5* 7.7*  7.7* 8.4*   < > 7.5*   < > 10.3*   MCV  --  91  --   --  90  --  90   PLT  --  85*  --   --  98*  --  151   INR  --   --   --   --   --   --  1.26*    < > = values in this interval not displayed.     Recent Labs   Lab Test 03/10/25  0734 25  0807 25  1400   POTASSIUM 4.4 4.3 3.5   CHLORIDE 110* 111* 107   CO2 26 26 24   BUN 28.2* 35.0* 39.5*   ANIONGAP 6* 4* 9     Recent Labs   Lab Test 03/10/25  0734 25  1400   ALBUMIN 3.2* 4.0   BILITOTAL 0.7 1.0   ALT 30 35   AST 27 32     MELD 3.0: 9 at 3/10/2025  7:34 AM  MELD-Na: 9 at 3/10/2025  7:34 AM  Calculated from:  Serum Creatinine: 0.83 mg/dL (Using min of 1  mg/dL) at 3/10/2025  7:34 AM  Serum Sodium: 142 mmol/L (Using max of 137 mmol/L) at 3/10/2025  7:34 AM  Total Bilirubin: 0.7 mg/dL (Using min of 1 mg/dL) at 3/10/2025  7:34 AM  Serum Albumin: 3.2 g/dL at 3/10/2025  7:34 AM  INR(ratio): 1.26 at 3/8/2025  2:00 PM  Age at listing (hypothetical): 30 years  Sex: Male at 3/10/2025  7:34 AM    IMAGING:   CTA ABDOMEN PELVIS w/IV contrast  IMPRESSION:  1.  No evidence of active gastrointestinal bleeding.  2.  Cirrhotic liver with features of portal hypertension including splenomegaly, a small amount of ascites, and varices including extensive gastroesophageal varices.  3.  Mild wall thickening of the ascending and proximal transverse colon is favored to reflect portal colopathy, although a mild colitis would appear similar.  4.  Innumerable benign cysts throughout both kidneys, which do not require follow-up.  5.  Bilateral nonobstructing intrarenal calculi measuring up to 4 mm on both sides.  6.  Fluid distends both seminal vesicles, likely related to the recent transurethral resection of the ejaculatory ducts. No abscess.    US ABDOMEN 3/8/2025  IMPRESSION:  1.  Morphologic changes of cirrhosis. Trace ascites.  2.  Normal Doppler evaluation of the hepatic vasculature.  3.  Mild gallbladder wall thickening, nonspecific and may be related to cirrhosis. No cholelithiasis or biliary dilatation.    ENDOSCOPIC EVALUATION:  EGD 3/9/2025 (Dr. Almeida)  Findings:       Large (> 5 mm) varices with stigmata of recent bleeding were found in        the lower third of the esophagus. Red cristina signs were present. Six bands        were successfully placed with incomplete eradication of varices. There        was no bleeding at the end of the procedure.        Type 1 gastroesophageal varices (GOV1, esophageal varices which extend        along the lesser curvature) with no bleeding were found in the cardia.        There were no stigmata of recent bleeding. They were medium in largest         diameter.        Moderate portal hypertensive gastropathy was found in the entire        examined stomach.        The duodenal bulb, first portion of the duodenum and second portion of        the duodenum were normal.                                                                                    Impression:         - Large (> 5 mm) esophageal varices with stigmata                             of recent bleeding and extensive red cristina signs.                             Incompletely eradicated. Banded.                             - Type 1 gastroesophageal varices (GOV1, esophageal                             varices which extend along the lesser curvature),                             without bleeding.                             - Portal hypertensive gastropathy.                             - Normal duodenal bulb, first portion of the                             duodenum and second portion of the duodenum.                             - No specimens collected.     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 CT imaging with Cirrhotic liver with features of portal hypertension including splenomegaly, a small amount of ascites, and varices including extensive gastroesophageal varices and polycystic kidneys. RUQ US with normal hepatic vasculature. LFTs normal. Patient denies any alcohol use. No family history of liver disease. EGD 3/9 with large varices, banded, and portal hypertensive gastropathy.     PLAN:   - Continue octreotide x72hrs  - IV PPI BID   - Will need repeat EGD to band remaining varices  - Monitor hgb, transfuse prn   - Will need outpatient liver follow-up. [facility] will contact patient to arrange.   - Ongoing liver workup  HCV antibody - non reactive   HBV S Ab, HBV S Ag, HBV C Ab - pending   Mitochondrial M2 Antibody IgG - pending   MARK - pending   F Actin EIA with reflex -  pending   Ceruloplasmin - normal   Iron panel - pending   Ferritin - pending   Celiac - pending    Alpha-1-antitrypsin - pending    Discussed patient findings and plan with Dr. Polk.     Total time: 25 minutes of total time was spent providing patient care, including patient evaluation, reviewing documentation/test results, and .     Lela Camacho PA-C  [facility] ([facility])

## 2025-03-10 NOTE — PLAN OF CARE
"Goal Outcome Evaluation:      Plan of Care Reviewed With: patient    Overall Patient Progress: no changeOverall Patient Progress: no change    Outcome Evaluation: Pt alert and orientated. on RA. Denies pain. 2 PIVs infusing NS @ 100ml/hr and octreotide @ 10ml/hr. up SBA. Passing gas but no bm this shift. Awaiting discharge plans    Temp: 98.1  F (36.7  C) Temp src: Oral BP: 121/58 Pulse: 71   Resp: 16 SpO2: 99 % O2 Device: None (Room air) Oxygen Delivery: 5 LPM       Problem: Adult Inpatient Plan of Care  Goal: Plan of Care Review  Description: The Plan of Care Review/Shift note should be completed every shift.  The Outcome Evaluation is a brief statement about your assessment that the patient is improving, declining, or no change.  This information will be displayed automatically on your shift  note.  3/9/2025 0611 by Celeste Ordonez RN  Outcome: Not Progressing  Flowsheets (Taken 3/9/2025 0610)  Outcome Evaluation: Pt alert and orientated. on RA. Denies pain. 2 PIVs infusing NS @ 100ml/hr and octreotide @ 10ml/hr. up SBA. Multiple loose bloody & black stools this shift. NPO for EGD today  Plan of Care Reviewed With: patient  Overall Patient Progress: no change  3/8/2025 2021 by Celeste Ordonez RN  Outcome: Not Progressing  Flowsheets (Taken 3/8/2025 2021)  Plan of Care Reviewed With: patient  Overall Patient Progress: no change  Goal: Patient-Specific Goal (Individualized)  Description: You can add care plan individualizations to a care plan. Examples of Individualization might be:  \"Parent requests to be called daily at 9am for status\", \"I have a hard time hearing out of my right ear\", or \"Do not touch me to wake me up as it startles  me\".  3/9/2025 0611 by Celeste Ordonez, RN  Outcome: Not Progressing  3/8/2025 2021 by Celeste Ordonez, RN  Outcome: Not Progressing  Goal: Absence of Hospital-Acquired Illness or Injury  3/9/2025 0611 by Celeste Ordonez RN  Outcome: Not Progressing  3/8/2025 2021 by " Celeste Ordonez RN  Outcome: Not Progressing  Intervention: Identify and Manage Fall Risk  Recent Flowsheet Documentation  Taken 3/8/2025 2018 by Celeste Ordonez RN  Safety Promotion/Fall Prevention:   safety round/check completed   nonskid shoes/slippers when out of bed  Intervention: Prevent Skin Injury  Recent Flowsheet Documentation  Taken 3/9/2025 0013 by Celeste Ordonez RN  Body Position: position changed independently  Taken 3/8/2025 2018 by Celeste Ordonez RN  Body Position: position changed independently  Intervention: Prevent Infection  Recent Flowsheet Documentation  Taken 3/8/2025 2018 by Celeste Ordonez RN  Infection Prevention:   rest/sleep promoted   hand hygiene promoted  Goal: Optimal Comfort and Wellbeing  3/9/2025 0611 by Celeste Ordonez RN  Outcome: Not Progressing  3/8/2025 2021 by Celeste Ordonez RN  Outcome: Not Progressing  Goal: Readiness for Transition of Care  3/9/2025 0611 by Celeste Ordonez RN  Outcome: Not Progressing  3/8/2025 2021 by Celeste Ordonez RN  Outcome: Not Progressing  Intervention: Mutually Develop Transition Plan  Recent Flowsheet Documentation  Taken 3/8/2025 2000 by Celeste Ordonez RN  Equipment Currently Used at Home: none     Problem: Nausea and Vomiting  Goal: Nausea and Vomiting Relief  3/9/2025 0611 by Celeste Ordonez RN  Outcome: Not Progressing  3/8/2025 2021 by Celeste Ordonez RN  Outcome: Not Progressing     Problem: Skin Injury Risk Increased  Goal: Skin Health and Integrity  3/9/2025 0611 by Celeste Ordonez RN  Outcome: Not Progressing  3/8/2025 2021 by Celeste Ordonez RN  Outcome: Not Progressing  Intervention: Plan: Nurse Driven Intervention: Moisture Management  Recent Flowsheet Documentation  Taken 3/9/2025 0013 by Celeste Ordonez RN  Bathing/Skin Care:   dressed/undressed   incontinence care  Intervention: Optimize Skin Protection  Recent Flowsheet Documentation  Taken 3/9/2025 0013 by Celeste Ordonez RN  Activity  Management:   ambulated to bathroom   back to bed  Head of Bed (HOB) Positioning: HOB at 20-30 degrees  Taken 3/8/2025 2018 by Celeste Ordonez, RN  Activity Management: activity adjusted per tolerance  Head of Bed (HOB) Positioning: HOB at 20-30 degrees

## 2025-03-10 NOTE — PLAN OF CARE
"Pt is alert and oriented. Pt denies any pain or shortness of breath and on room air.     NPO with ice chips maintained. Pt denies any nausea. Dime sized coffee brown BM observed overnight.     Hgb- 8.4; recheck in the AM.    IV NS and Octreotide infusing.Ongoing monitoring.    Plan: Gastro following. Nephrology consult.    /68 (BP Location: Left arm, Patient Position: Semi-Hendrickson's, Cuff Size: Adult Regular)   Pulse 66   Temp 98.3  F (36.8  C) (Oral)   Resp 16   Ht 1.829 m (6')   Wt 71.7 kg (158 lb 1.1 oz)   SpO2 98%   BMI 21.44 kg/m       Problem: Adult Inpatient Plan of Care  Goal: Plan of Care Review  Description: The Plan of Care Review/Shift note should be completed every shift.  The Outcome Evaluation is a brief statement about your assessment that the patient is improving, declining, or no change.  This information will be displayed automatically on your shift  note.  Outcome: Progressing  Flowsheets (Taken 3/10/2025 0206)  Outcome Evaluation: No BM at this time. Pt deneis any pain.  Plan of Care Reviewed With: patient  Overall Patient Progress: improving  Goal: Patient-Specific Goal (Individualized)  Description: You can add care plan individualizations to a care plan. Examples of Individualization might be:  \"Parent requests to be called daily at 9am for status\", \"I have a hard time hearing out of my right ear\", or \"Do not touch me to wake me up as it startles  me\".  Outcome: Progressing  Goal: Absence of Hospital-Acquired Illness or Injury  Outcome: Progressing  Intervention: Identify and Manage Fall Risk  Recent Flowsheet Documentation  Taken 3/10/2025 0000 by Aditya De La Cruz, RN  Safety Promotion/Fall Prevention:   safety round/check completed   patient and family education   lighting adjusted   assistive device/personal items within reach   nonskid shoes/slippers when out of bed  Intervention: Prevent Skin Injury  Recent Flowsheet Documentation  Taken 3/10/2025 0000 by Aditya De La Cruz, " RN  Body Position: position changed independently  Taken 3/9/2025 2337 by Aditya De La Cruz RN  Body Position: position changed independently  Intervention: Prevent and Manage VTE (Venous Thromboembolism) Risk  Recent Flowsheet Documentation  Taken 3/10/2025 0000 by Aditya De La Cruz RN  VTE Prevention/Management: SCDs off (sequential compression devices)  Intervention: Prevent Infection  Recent Flowsheet Documentation  Taken 3/10/2025 0000 by Aditya De La Cruz RN  Infection Prevention:   single patient room provided   rest/sleep promoted   hand hygiene promoted  Goal: Optimal Comfort and Wellbeing  Outcome: Progressing  Goal: Readiness for Transition of Care  Outcome: Progressing     Problem: Nausea and Vomiting  Goal: Nausea and Vomiting Relief  Outcome: Progressing     Problem: Skin Injury Risk Increased  Goal: Skin Health and Integrity  Outcome: Progressing  Intervention: Plan: Nurse Driven Intervention: Moisture Management  Recent Flowsheet Documentation  Taken 3/10/2025 0000 by Aditya De La Cruz RN  Moisture Interventions: Encourage regular toileting  Intervention: Optimize Skin Protection  Recent Flowsheet Documentation  Taken 3/10/2025 0000 by Aditya De La Cruz RN  Head of Bed (HOB) Positioning: HOB at 20-30 degrees  Taken 3/9/2025 2337 by Aditya De La Cruz RN  Head of Bed (HOB) Positioning: HOB at 20-30 degrees   Goal Outcome Evaluation:      Plan of Care Reviewed With: patient    Overall Patient Progress: improvingOverall Patient Progress: improving    Outcome Evaluation: No BM at this time. Pt deneis any pain.

## 2025-03-11 LAB
ALBUMIN SERPL BCG-MCNC: 3.4 G/DL (ref 3.5–5.2)
ALP SERPL-CCNC: 32 U/L (ref 40–150)
ALT SERPL W P-5'-P-CCNC: 25 U/L (ref 0–70)
ANA SER QL IF: NEGATIVE
ANION GAP SERPL CALCULATED.3IONS-SCNC: 5 MMOL/L (ref 7–15)
AST SERPL W P-5'-P-CCNC: 26 U/L (ref 0–45)
BILIRUB SERPL-MCNC: 0.5 MG/DL
BLD PROD TYP BPU: NORMAL
BLOOD COMPONENT TYPE: NORMAL
BUN SERPL-MCNC: 21.5 MG/DL (ref 6–20)
CALCIUM SERPL-MCNC: 7.6 MG/DL (ref 8.8–10.4)
CHLORIDE SERPL-SCNC: 112 MMOL/L (ref 98–107)
CODING SYSTEM: NORMAL
CREAT SERPL-MCNC: 0.83 MG/DL (ref 0.67–1.17)
CROSSMATCH: NORMAL
EGFRCR SERPLBLD CKD-EPI 2021: >90 ML/MIN/1.73M2
ERYTHROCYTE [DISTWIDTH] IN BLOOD BY AUTOMATED COUNT: 13.8 % (ref 10–15)
FERRITIN SERPL-MCNC: 76 NG/ML (ref 31–409)
GLUCOSE SERPL-MCNC: 101 MG/DL (ref 70–99)
HBV CORE AB SERPL QL IA: NONREACTIVE
HBV SURFACE AB SERPL IA-ACNC: >1000 M[IU]/ML
HBV SURFACE AB SERPL IA-ACNC: REACTIVE M[IU]/ML
HBV SURFACE AG SERPL QL IA: NONREACTIVE
HCO3 SERPL-SCNC: 26 MMOL/L (ref 22–29)
HCT VFR BLD AUTO: 20.4 % (ref 40–53)
HGB BLD-MCNC: 6.8 G/DL (ref 13.3–17.7)
HGB BLD-MCNC: 9.1 G/DL (ref 13.3–17.7)
ISSUE DATE AND TIME: NORMAL
MCH RBC QN AUTO: 30.4 PG (ref 26.5–33)
MCHC RBC AUTO-ENTMCNC: 33.3 G/DL (ref 31.5–36.5)
MCV RBC AUTO: 91 FL (ref 78–100)
MITOCHONDRIA M2 IGG SER-ACNC: <1 U/ML
PLATELET # BLD AUTO: 70 10E3/UL (ref 150–450)
POTASSIUM SERPL-SCNC: 4 MMOL/L (ref 3.4–5.3)
PROT SERPL-MCNC: 4.6 G/DL (ref 6.4–8.3)
RBC # BLD AUTO: 2.24 10E6/UL (ref 4.4–5.9)
SODIUM SERPL-SCNC: 143 MMOL/L (ref 135–145)
UNIT ABO/RH: NORMAL
UNIT NUMBER: NORMAL
UNIT STATUS: NORMAL
UNIT TYPE ISBT: 5100
WBC # BLD AUTO: 3.1 10E3/UL (ref 4–11)

## 2025-03-11 PROCEDURE — 258N000003 HC RX IP 258 OP 636: Performed by: EMERGENCY MEDICINE

## 2025-03-11 PROCEDURE — 250N000011 HC RX IP 250 OP 636: Performed by: INTERNAL MEDICINE

## 2025-03-11 PROCEDURE — 258N000003 HC RX IP 258 OP 636: Performed by: INTERNAL MEDICINE

## 2025-03-11 PROCEDURE — 82040 ASSAY OF SERUM ALBUMIN: CPT | Performed by: STUDENT IN AN ORGANIZED HEALTH CARE EDUCATION/TRAINING PROGRAM

## 2025-03-11 PROCEDURE — 120N000001 HC R&B MED SURG/OB

## 2025-03-11 PROCEDURE — 99233 SBSQ HOSP IP/OBS HIGH 50: CPT | Performed by: INTERNAL MEDICINE

## 2025-03-11 PROCEDURE — P9040 RBC LEUKOREDUCED IRRADIATED: HCPCS | Performed by: INTERNAL MEDICINE

## 2025-03-11 PROCEDURE — 85018 HEMOGLOBIN: CPT | Performed by: STUDENT IN AN ORGANIZED HEALTH CARE EDUCATION/TRAINING PROGRAM

## 2025-03-11 PROCEDURE — 250N000011 HC RX IP 250 OP 636: Mod: JA | Performed by: EMERGENCY MEDICINE

## 2025-03-11 PROCEDURE — 36415 COLL VENOUS BLD VENIPUNCTURE: CPT | Performed by: STUDENT IN AN ORGANIZED HEALTH CARE EDUCATION/TRAINING PROGRAM

## 2025-03-11 PROCEDURE — P9016 RBC LEUKOCYTES REDUCED: HCPCS

## 2025-03-11 PROCEDURE — 85041 AUTOMATED RBC COUNT: CPT | Performed by: STUDENT IN AN ORGANIZED HEALTH CARE EDUCATION/TRAINING PROGRAM

## 2025-03-11 PROCEDURE — 85027 COMPLETE CBC AUTOMATED: CPT | Performed by: STUDENT IN AN ORGANIZED HEALTH CARE EDUCATION/TRAINING PROGRAM

## 2025-03-11 PROCEDURE — 84155 ASSAY OF PROTEIN SERUM: CPT | Performed by: STUDENT IN AN ORGANIZED HEALTH CARE EDUCATION/TRAINING PROGRAM

## 2025-03-11 RX ADMIN — SODIUM CHLORIDE: 0.9 INJECTION, SOLUTION INTRAVENOUS at 22:06

## 2025-03-11 RX ADMIN — CEFTRIAXONE 2 G: 2 INJECTION, POWDER, FOR SOLUTION INTRAMUSCULAR; INTRAVENOUS at 16:55

## 2025-03-11 RX ADMIN — PANTOPRAZOLE SODIUM 40 MG: 40 INJECTION, POWDER, FOR SOLUTION INTRAVENOUS at 08:57

## 2025-03-11 RX ADMIN — PANTOPRAZOLE SODIUM 40 MG: 40 INJECTION, POWDER, FOR SOLUTION INTRAVENOUS at 19:31

## 2025-03-11 RX ADMIN — SODIUM CHLORIDE: 0.9 INJECTION, SOLUTION INTRAVENOUS at 08:59

## 2025-03-11 RX ADMIN — OCTREOTIDE ACETATE 50 MCG/HR: 200 INJECTION, SOLUTION INTRAVENOUS; SUBCUTANEOUS at 17:46

## 2025-03-11 ASSESSMENT — ACTIVITIES OF DAILY LIVING (ADL)
ADLS_ACUITY_SCORE: 39
ADLS_ACUITY_SCORE: 40
ADLS_ACUITY_SCORE: 40
ADLS_ACUITY_SCORE: 36
ADLS_ACUITY_SCORE: 39
ADLS_ACUITY_SCORE: 40
ADLS_ACUITY_SCORE: 36
ADLS_ACUITY_SCORE: 40
ADLS_ACUITY_SCORE: 36
ADLS_ACUITY_SCORE: 36
ADLS_ACUITY_SCORE: 40
ADLS_ACUITY_SCORE: 40
ADLS_ACUITY_SCORE: 39
ADLS_ACUITY_SCORE: 36
ADLS_ACUITY_SCORE: 40
ADLS_ACUITY_SCORE: 39
ADLS_ACUITY_SCORE: 40
ADLS_ACUITY_SCORE: 36

## 2025-03-11 NOTE — PROGRESS NOTES
Notes, labs, vitals reviewed.  We will arrange follow-up in our office in the next few mos and will plan to see him annually.  I will sign off.  Please call if any ?s.

## 2025-03-11 NOTE — PLAN OF CARE
"/68  Pulse 73   Temp 98.5  F  Resp 16  SpO2 98%     Patient is alert and oriented x4, no complaints of pain, SBA with transfers, IV infusing NS, continent of bowel and bladder, had a loose BM, NPO.      Goal Outcome Evaluation:      Plan of Care Reviewed With: patient    Overall Patient Progress: improvingOverall Patient Progress: improving    Outcome Evaluation: Patient is alert and oriented x4, no complaints of pain, SBA with transfers, IV infusing NS, continent of bowel and bladder, had a loose BM, NPO.      Problem: Adult Inpatient Plan of Care  Goal: Plan of Care Review  Description: The Plan of Care Review/Shift note should be completed every shift.  The Outcome Evaluation is a brief statement about your assessment that the patient is improving, declining, or no change.  This information will be displayed automatically on your shift  note.  Outcome: Progressing  Flowsheets (Taken 3/10/2025 2042)  Outcome Evaluation: Patient is alert and oriented x4, no complaints of pain, SBA with transfers, IV infusing NS, continent of bowel and bladder, had a loose BM, NPO.  Plan of Care Reviewed With: patient  Overall Patient Progress: improving  Goal: Patient-Specific Goal (Individualized)  Description: You can add care plan individualizations to a care plan. Examples of Individualization might be:  \"Parent requests to be called daily at 9am for status\", \"I have a hard time hearing out of my right ear\", or \"Do not touch me to wake me up as it startles  me\".  Outcome: Progressing  Goal: Absence of Hospital-Acquired Illness or Injury  Outcome: Progressing  Intervention: Prevent Skin Injury  Recent Flowsheet Documentation  Taken 3/10/2025 1950 by Diana Han, RN  Body Position: position changed independently  Intervention: Prevent and Manage VTE (Venous Thromboembolism) Risk  Recent Flowsheet Documentation  Taken 3/10/2025 1950 by Diana Han, RN  VTE Prevention/Management: SCDs off (sequential compression " devices)  Goal: Optimal Comfort and Wellbeing  Outcome: Progressing  Goal: Readiness for Transition of Care  Outcome: Progressing

## 2025-03-11 NOTE — PLAN OF CARE
Goal Outcome Evaluation:      Plan of Care Reviewed With: patient    Overall Patient Progress: improvingOverall Patient Progress: improving    Outcome Evaluation: One unit of blood administered this shift, advanced to full liquid diet tolerating, ambulated once this shift. Continues with ongoing care.    End of shift summary-0700-1500  Dx: Upper GI bleed , Gastroesophageal varices s/p banding.   A/O: Alert and Oriented x4  Diet: Full liquid  Fluids: has Normal Saline 0.9% running at 100 mL per hour. With Octreotide at 10ml/hr.   Transfer: are SBA with IV Pole.   Bathroom: continent with b/b.   Pain: denying pain.  Telemetry Monitoring: No  Treatment: IVFs, hemoglobin monitoring, abx tx.   Discharge Plans: TBD, continues with ongoing care.         Blood pressure 118/74, pulse 63, temperature 98.6  F (37  C), temperature source Oral, resp. rate 15, height 1.829 m (6'), weight 71.7 kg (158 lb 1.1 oz), SpO2 96%.     Problem: Adult Inpatient Plan of Care  Goal: Plan of Care Review  Description: The Plan of Care Review/Shift note should be completed every shift.  The Outcome Evaluation is a brief statement about your assessment that the patient is improving, declining, or no change.  This information will be displayed automatically on your shift  note.  Outcome: Progressing  Flowsheets (Taken 3/11/2025 1423)  Outcome Evaluation: One unit of blood administered this shift, advanced to full liquid diet tolerating, ambulated once this shift. Continues with ongoing care.  Plan of Care Reviewed With: patient  Overall Patient Progress: improving

## 2025-03-11 NOTE — PLAN OF CARE
"Goal Outcome Evaluation:      Plan of Care Reviewed With: patient    Overall Patient Progress: no changeOverall Patient Progress: no change    Outcome Evaluation: A&Ox4. No bloody stool or emesis noted. Tolerating full liquid diet. Up independent in room. Will continue with plan of care.      Problem: Adult Inpatient Plan of Care  Goal: Plan of Care Review  Description: The Plan of Care Review/Shift note should be completed every shift.  The Outcome Evaluation is a brief statement about your assessment that the patient is improving, declining, or no change.  This information will be displayed automatically on your shift  note.  Outcome: Progressing  Flowsheets (Taken 3/11/2025 4230)  Outcome Evaluation: A&Ox4. No bloody stool or emesis noted. Tolerating full liquid diet. Up independent in room. Will continue with plan of care.  Plan of Care Reviewed With: patient  Overall Patient Progress: no change  Goal: Patient-Specific Goal (Individualized)  Description: You can add care plan individualizations to a care plan. Examples of Individualization might be:  \"Parent requests to be called daily at 9am for status\", \"I have a hard time hearing out of my right ear\", or \"Do not touch me to wake me up as it startles  me\".  Outcome: Progressing  Goal: Absence of Hospital-Acquired Illness or Injury  Outcome: Progressing  Intervention: Prevent Infection  Recent Flowsheet Documentation  Taken 3/11/2025 1713 by Melissa Downing RN  Infection Prevention: single patient room provided  Goal: Optimal Comfort and Wellbeing  Outcome: Progressing  Goal: Readiness for Transition of Care  Outcome: Progressing     Problem: Nausea and Vomiting  Goal: Nausea and Vomiting Relief  Outcome: Progressing     Problem: Skin Injury Risk Increased  Goal: Skin Health and Integrity  Outcome: Progressing     "

## 2025-03-11 NOTE — PROGRESS NOTES
GASTROENTEROLOGY PROGRESS NOTE     SUBJECTIVE:  No melena or hematochezia. Passed brown/yellow stool earlier. Denies nausea, vomiting, abdominal pain.      OBJECTIVE:  /74 (BP Location: Right arm)   Pulse 68   Temp 98.6  F (37  C) (Oral)   Resp 16   Ht 1.829 m (6')   Wt 71.7 kg (158 lb 1.1 oz)   SpO2 99%   BMI 21.44 kg/m    Temp (24hrs), Av.4  F (36.9  C), Min:98  F (36.7  C), Max:98.6  F (37  C)    Patient Vitals for the past 72 hrs:   Weight   25 1826 71.7 kg (158 lb 1.1 oz)   25 1338 75.2 kg (165 lb 12.6 oz)       Intake/Output Summary (Last 24 hours) at 3/11/2025 1212  Last data filed at 3/10/2025 2243  Gross per 24 hour   Intake --   Output 1600 ml   Net -1600 ml        PHYSICAL EXAM  GENERAL: No obvious distress  EYES: No scleral icterus  ABDOMEN: Non-distended. Positive bowel sounds. Soft. Non-tender.  SKIN: No jaundice  NEUROLOGIC: Alert and oriented. No asterixis.   PSYCHIATRIC: Normal affect     Additional Comments:  ROS, FH, SH: See initial GI consult for details.     I have reviewed the patient's new clinical lab results:     Recent Labs   Lab Test 25  0642 03/10/25  2110 03/10/25  1424 03/10/25  0734 25  1210 25  0807 25  1722 25  1400   WBC 3.1*  --   --  5.5  --  6.0  --  8.9   HGB 6.8* 7.5* 7.5* 7.7*  7.7*   < > 7.5*   < > 10.3*   MCV 91  --   --  91  --  90  --  90   PLT 70*  --   --  85*  --  98*  --  151   INR  --   --   --   --   --   --   --  1.26*    < > = values in this interval not displayed.     Recent Labs   Lab Test 25  0642 03/10/25  0734 25  0807   POTASSIUM 4.0 4.4 4.3   CHLORIDE 112* 110* 111*   CO2 26 26 26   BUN 21.5* 28.2* 35.0*   ANIONGAP 5* 6* 4*     Recent Labs   Lab Test 25  0642 03/10/25  0734 25  1400   ALBUMIN 3.4* 3.2* 4.0   BILITOTAL 0.5 0.7 1.0   ALT 25 30 35   AST 26 27 32     MELD 3.0: 9 at 3/10/2025  7:34 AM  MELD-Na: 9 at 3/10/2025  7:34 AM  Calculated from:  Serum Creatinine: 0.83  mg/dL (Using min of 1 mg/dL) at 3/10/2025  7:34 AM  Serum Sodium: 142 mmol/L (Using max of 137 mmol/L) at 3/10/2025  7:34 AM  Total Bilirubin: 0.7 mg/dL (Using min of 1 mg/dL) at 3/10/2025  7:34 AM  Serum Albumin: 3.2 g/dL at 3/10/2025  7:34 AM  INR(ratio): 1.26 at 3/8/2025  2:00 PM  Age at listing (hypothetical): 30 years  Sex: Male at 3/10/2025  7:34 AM    IMAGING:   CTA ABDOMEN PELVIS w/IV contrast  IMPRESSION:  1.  No evidence of active gastrointestinal bleeding.  2.  Cirrhotic liver with features of portal hypertension including splenomegaly, a small amount of ascites, and varices including extensive gastroesophageal varices.  3.  Mild wall thickening of the ascending and proximal transverse colon is favored to reflect portal colopathy, although a mild colitis would appear similar.  4.  Innumerable benign cysts throughout both kidneys, which do not require follow-up.  5.  Bilateral nonobstructing intrarenal calculi measuring up to 4 mm on both sides.  6.  Fluid distends both seminal vesicles, likely related to the recent transurethral resection of the ejaculatory ducts. No abscess.     US ABDOMEN 3/8/2025  IMPRESSION:  1.  Morphologic changes of cirrhosis. Trace ascites.  2.  Normal Doppler evaluation of the hepatic vasculature.  3.  Mild gallbladder wall thickening, nonspecific and may be related to cirrhosis. No cholelithiasis or biliary dilatation.     ENDOSCOPIC EVALUATION:  EGD 3/9/2025 (Dr. Almeida)  Findings:       Large (> 5 mm) varices with stigmata of recent bleeding were found in        the lower third of the esophagus. Red cristina signs were present. Six bands        were successfully placed with incomplete eradication of varices. There        was no bleeding at the end of the procedure.        Type 1 gastroesophageal varices (GOV1, esophageal varices which extend        along the lesser curvature) with no bleeding were found in the cardia.        There were no stigmata of recent bleeding. They were  medium in largest        diameter.        Moderate portal hypertensive gastropathy was found in the entire        examined stomach.        The duodenal bulb, first portion of the duodenum and second portion of        the duodenum were normal.                                                                                    Impression:         - Large (> 5 mm) esophageal varices with stigmata                             of recent bleeding and extensive red cristina signs.                             Incompletely eradicated. Banded.                             - Type 1 gastroesophageal varices (GOV1, esophageal                             varices which extend along the lesser curvature),                             without bleeding.                             - Portal hypertensive gastropathy.                             - Normal duodenal bulb, first portion of the                             duodenum and second portion of the duodenum.                             - No specimens collected.      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 CT imaging with cirrhotic liver with features of portal hypertension including splenomegaly, a small amount of ascites, and varices including extensive gastroesophageal varices and polycystic kidneys. RUQ US with normal hepatic vasculature. LFTs normal. Patient denies any alcohol use. No family history of liver disease.     EGD 3/9 with large varices, banded, and portal hypertensive gastropathy.     3/11 patient hgb dropped to 6.8 and patient received unit of RBCs. Denies any overt bleeding.      PLAN:   - Full liquid diet  - Continue octreotide x72hrs  - IV PPI BID   - Will need repeat EGD at some point to band remaining varices  - Monitor hgb, transfuse prn   - Monitor stools for overt bleeding  - Will need outpatient liver follow-up. [facility] will contact  patient to arrange.   - Ongoing liver workup:  HCV antibody - non reactive   HBV S Ab, HBV S Ag, HBV C Ab - non reactive, shows immunity due to vaccine  Mitochondrial M2 Antibody IgG - pending  MARK - pending  F Actin EIA with reflex - pending  Ceruloplasmin - normal  Iron panel - low iron and iron binding capacity  Ferritin - normal  Celiac - pending   Alpha-1-antitrypsin - pending  Schistosoma - pending     Discussed patient findings and plan with Dr. Polk.     Total time: 25 minutes of total time was spent providing patient care, including patient evaluation, reviewing documentation/test results, and .     Lela Camacho PA-C  [facility] ([facility])

## 2025-03-11 NOTE — PROGRESS NOTES
[facility]    Medicine Progress Note - Hospitalist Service    Date of Admission:  3/8/2025    Assessment & Plan   Willi Lares is a 30 year old male admitted on 3/8/2025. He has medical history of azoospermia and family history of polycystic kidney disease.  He presented to the ED with complaints of vomiting blood and also having dark watery bowel movements.  ED evaluation showed stable vital signs. Laboratory evaluation showed HGB 10.3. CT showed new cirrhosis, polycystic kidneys, and evidence of varices.  Hemoglobin dipped to 7.5 early in admission for which 1 unit of PRBCs was transfused.  GI consulted and he had EGD with banding on 3/9 with plan for repeat EGD prior to discharge.  He was also noted to have innumerable benign cysts in both kidneys worrisome for PCKD. This would also be a new diagnosis for patient, although less surprising with his family history. Nephrology was consultedy for initial assessment of this new diagnosis. Hemoglobin drifted down to 6.8 on 3/11 for which a second unit of RBCs was transfused.     Problem list:      Upper GI bleed  Gastroesophageal varices s/p banding  Hematemesis  Acute blood loss anemia  -S/p EGD 3/9 with large varices, multiple banded but will need repeat EGD for additional management (planned for today?)  -S/p transfusion of 1 unit of RBCs on 3/9 for HGB 7.5  -Transfuse a second unit of RBCs today for HGB 6.8  -Continue IV Protonix twice daily  -Continue octreotide infusion- likely stop this afternoon  -Continue HGB monitoring  -Conditional order to transfuse for HGB of 7 or less     Cirrhosis  -New diagnosis.  CT and ultrasound imaging consistent with cirrhosis.  No clear risk factors, denies heavy alcohol use.  Does have polycystic kidney disease in the family, but no visible cysts on liver on imaging.  Viral and autoimmune workup per GI.  -Follow-up viral, autoimmune workup for cirrhosis  -[facility] following, appreciate assistance  -Will need  outpatient follow up     Polycystic kidney disease  -Family history of polycystic kidney disease  -Patient was not previously diagnosed with PCKD  -Nephrology consult appreciated  -Will need outpatient follow up          Diet: NPO for Medical/Clinical Reasons Except for: Meds, Ice Chips    DVT Prophylaxis: Pneumatic Compression Devices  Kumari Catheter: Not present  Lines: None     Cardiac Monitoring: None  Code Status: Full Code      Clinically Significant Risk Factors          # Hyperchloremia: Highest Cl = 112 mmol/L in last 2 days, will monitor as appropriate      # Hypocalcemia: Lowest Ca = 7.6 mg/dL in last 2 days, will monitor and replace as appropriate     # Hypoalbuminemia: Lowest albumin = 3.2 g/dL at 3/10/2025  7:34 AM, will monitor as appropriate   # Thrombocytopenia: Lowest platelets = 70 in last 2 days, will monitor for bleeding                         Social Drivers of Health            Disposition Plan     Medically Ready for Discharge: Anticipated Tomorrow             Guanako Dalal MD  Hospitalist Service  [facility]  Securely message with [employer] (more info)  Text page via [employer] Voicemail Paging/Directory   ______________________________________________________________________    Interval History   No new problems.  No signs of ongoing GI blood loss in the last day. No pain. No dizziness.     Physical Exam   Vital Signs: Temp: 98  F (36.7  C) Temp src: Oral BP: 102/62 Pulse: 59   Resp: 16 SpO2: 99 % O2 Device: None (Room air)    Weight: 158 lbs 1.12 oz    GENERAL:  Comfortable. Cooperative.  PSYCH: pleasant, oriented, No acute distress.  EYES: PERRLA, Normal conjunctiva.  HEART:  Regular rate and rhythm. No JVD. Pulses normal. No edema.  LUNGS:  Clear to auscultation, normal Respiratory effort.  ABDOMEN:  Soft, no hepatosplenomegaly, normal bowel sounds.  EXTREMETIES: No clubbing, cyanosis or ischemia  SKIN:  Dry to touch, No rash.      Medical Decision Making       45 MINUTES SPENT  BY ME on the date of service doing chart review, history, exam, documentation & further activities per the note.      Data     I have personally reviewed the following data over the past 24 hrs:    3.1 (L)  \   6.8 (LL)   / 70 (L)     143 112 (H) 21.5 (H) /  101 (H)   4.0 26 0.83 \     ALT: 25 AST: 26 AP: 32 (L) TBILI: 0.5   ALB: 3.4 (L) TOT PROTEIN: 4.6 (L) LIPASE: N/A     Ferritin:  76 % Retic:  N/A LDH:  N/A       Imaging results reviewed over the past 24 hrs:   No results found for this or any previous visit (from the past 24 hours).  Recent Labs   Lab 03/11/25  0642 03/10/25  2110 03/10/25  1424 03/10/25  0734 03/09/25  1210 03/09/25  0807 03/08/25  1722 03/08/25  1400   WBC 3.1*  --   --  5.5  --  6.0  --  8.9   HGB 6.8* 7.5* 7.5* 7.7*  7.7*   < > 7.5*   < > 10.3*   MCV 91  --   --  91  --  90  --  90   PLT 70*  --   --  85*  --  98*  --  151   INR  --   --   --   --   --   --   --  1.26*     --   --  142  --  141  --  140   POTASSIUM 4.0  --   --  4.4  --  4.3  --  3.5   CHLORIDE 112*  --   --  110*  --  111*  --  107   CO2 26  --   --  26  --  26  --  24   BUN 21.5*  --   --  28.2*  --  35.0*  --  39.5*   CR 0.83  --   --  0.83  --  0.98  --  0.74   ANIONGAP 5*  --   --  6*  --  4*  --  9   LIN 7.6*  --   --  7.9*  --  7.9*  --  8.3*   *  --   --  121*  --  140*  --  150*   ALBUMIN 3.4*  --   --  3.2*  --   --   --  4.0   PROTTOTAL 4.6*  --   --  5.0*  --   --   --  6.0*   BILITOTAL 0.5  --   --  0.7  --   --   --  1.0   ALKPHOS 32*  --   --  33*  --   --   --  47   ALT 25  --   --  30  --   --   --  35   AST 26  --   --  27  --   --   --  32    < > = values in this interval not displayed.

## 2025-03-11 NOTE — PLAN OF CARE
"CARE FROM [id number]    /63 (BP Location: Right arm, Patient Position: Semi-Hendrickson's, Cuff Size: Adult Regular)   Pulse 59   Temp 98.1  F (36.7  C) (Oral)   Resp 16   Ht 1.829 m (6')   Wt 71.7 kg (158 lb 1.1 oz)   SpO2 97%   BMI 21.44 kg/m      Goal Outcome Evaluation:      Plan of Care Reviewed With: patient    Overall Patient Progress: improvingOverall Patient Progress: improving    Outcome Evaluation: A&O x4. VSS, RA. Up w/ SBA. Infusing NS at 100 ml/hr and Octreotide at 10 ml/hr. NPO except for ice chips. Denies pain or discomfort at this time. [facility] and nephrology following.    Problem: Adult Inpatient Plan of Care  Goal: Plan of Care Review  Description: The Plan of Care Review/Shift note should be completed every shift.  The Outcome Evaluation is a brief statement about your assessment that the patient is improving, declining, or no change.  This information will be displayed automatically on your shift  note.  Outcome: Progressing  Flowsheets (Taken 3/11/2025 0114)  Outcome Evaluation: A&O x4. VSS, RA. Up w/ SBA. Infusing NS at 100 ml/hr and Octreotide at 10 ml/hr. NPO except for ice chips. Denies pain or discomfort at this time. [facility] and nephrology following.  Plan of Care Reviewed With: patient  Overall Patient Progress: improving  Goal: Patient-Specific Goal (Individualized)  Description: You can add care plan individualizations to a care plan. Examples of Individualization might be:  \"Parent requests to be called daily at 9am for status\", \"I have a hard time hearing out of my right ear\", or \"Do not touch me to wake me up as it startles  me\".  Outcome: Progressing  Goal: Absence of Hospital-Acquired Illness or Injury  Outcome: Progressing  Intervention: Identify and Manage Fall Risk  Recent Flowsheet Documentation  Taken 3/11/2025 0029 by Jeni Calero RN  Safety Promotion/Fall Prevention:   clutter free environment maintained   lighting adjusted   nonskid shoes/slippers when out of " bed   patient and family education   room near nurse's station   room organization consistent   safety round/check completed  Intervention: Prevent Infection  Recent Flowsheet Documentation  Taken 3/11/2025 0029 by Jeni Calero RN  Infection Prevention:   single patient room provided   rest/sleep promoted   hand hygiene promoted  Goal: Optimal Comfort and Wellbeing  Outcome: Progressing  Goal: Readiness for Transition of Care  Outcome: Progressing

## 2025-03-12 LAB
HGB BLD-MCNC: 7.4 G/DL (ref 13.3–17.7)
HGB BLD-MCNC: 9.1 G/DL (ref 13.3–17.7)
SMA IGG SER-ACNC: 6 UNITS
TTG IGA SER-ACNC: 0.8 U/ML
TTG IGG SER-ACNC: 1.2 U/ML

## 2025-03-12 PROCEDURE — 250N000011 HC RX IP 250 OP 636: Performed by: INTERNAL MEDICINE

## 2025-03-12 PROCEDURE — 85018 HEMOGLOBIN: CPT | Performed by: STUDENT IN AN ORGANIZED HEALTH CARE EDUCATION/TRAINING PROGRAM

## 2025-03-12 PROCEDURE — 99232 SBSQ HOSP IP/OBS MODERATE 35: CPT | Performed by: INTERNAL MEDICINE

## 2025-03-12 PROCEDURE — 258N000003 HC RX IP 258 OP 636: Performed by: INTERNAL MEDICINE

## 2025-03-12 PROCEDURE — 120N000001 HC R&B MED SURG/OB

## 2025-03-12 PROCEDURE — 36415 COLL VENOUS BLD VENIPUNCTURE: CPT | Performed by: STUDENT IN AN ORGANIZED HEALTH CARE EDUCATION/TRAINING PROGRAM

## 2025-03-12 RX ADMIN — PANTOPRAZOLE SODIUM 40 MG: 40 INJECTION, POWDER, FOR SOLUTION INTRAVENOUS at 07:54

## 2025-03-12 RX ADMIN — SODIUM CHLORIDE: 0.9 INJECTION, SOLUTION INTRAVENOUS at 17:39

## 2025-03-12 RX ADMIN — CEFTRIAXONE 2 G: 2 INJECTION, POWDER, FOR SOLUTION INTRAMUSCULAR; INTRAVENOUS at 17:29

## 2025-03-12 RX ADMIN — PANTOPRAZOLE SODIUM 40 MG: 40 INJECTION, POWDER, FOR SOLUTION INTRAVENOUS at 20:06

## 2025-03-12 RX ADMIN — SODIUM CHLORIDE: 0.9 INJECTION, SOLUTION INTRAVENOUS at 07:55

## 2025-03-12 ASSESSMENT — ACTIVITIES OF DAILY LIVING (ADL)
ADLS_ACUITY_SCORE: 39

## 2025-03-12 NOTE — PLAN OF CARE
"/65  Pulse 61   Temp 97.9  F  Resp 16  SpO2 97%     Patient is alert and oriented x4, no complaints of pain, independent in the room, continent of bowel and bladder, full liquid diet, hemoglobin recheck at 6am, NS running at 100.        Goal Outcome Evaluation:      Plan of Care Reviewed With: patient    Overall Patient Progress: no changeOverall Patient Progress: no change    Outcome Evaluation: Patient is alert and oriented x4, no complaints of pain, independent in the room, continent of bowel and bladder, full liquid diet, hemoglobin recheck at 6am, NS running at 100.      Problem: Adult Inpatient Plan of Care  Goal: Plan of Care Review  Description: The Plan of Care Review/Shift note should be completed every shift.  The Outcome Evaluation is a brief statement about your assessment that the patient is improving, declining, or no change.  This information will be displayed automatically on your shift  note.  Outcome: Progressing  Flowsheets (Taken 3/12/2025 0005)  Outcome Evaluation: Patient is alert and oriented x4, no complaints of pain, independent in the room, continent of bowel and bladder, full liquid diet, hemoglobin recheck at 6am, NS running at 100.  Plan of Care Reviewed With: patient  Overall Patient Progress: no change  Goal: Patient-Specific Goal (Individualized)  Description: You can add care plan individualizations to a care plan. Examples of Individualization might be:  \"Parent requests to be called daily at 9am for status\", \"I have a hard time hearing out of my right ear\", or \"Do not touch me to wake me up as it startles  me\".  Outcome: Progressing  Goal: Absence of Hospital-Acquired Illness or Injury  Outcome: Progressing  Intervention: Identify and Manage Fall Risk  Recent Flowsheet Documentation  Taken 3/11/2025 1938 by Diana Han, RN  Safety Promotion/Fall Prevention:   clutter free environment maintained   lighting adjusted   nonskid shoes/slippers when out of bed   patient and " family education   room near nurse's station   room organization consistent   safety round/check completed  Intervention: Prevent Skin Injury  Recent Flowsheet Documentation  Taken 3/11/2025 1938 by Diana Han, RN  Body Position: position changed independently  Intervention: Prevent and Manage VTE (Venous Thromboembolism) Risk  Recent Flowsheet Documentation  Taken 3/11/2025 1938 by Diana Han, RN  VTE Prevention/Management: SCDs off (sequential compression devices)  Intervention: Prevent Infection  Recent Flowsheet Documentation  Taken 3/11/2025 1938 by Diana Han, RN  Infection Prevention:   cohorting utilized   hand hygiene promoted   rest/sleep promoted   single patient room provided  Goal: Optimal Comfort and Wellbeing  Outcome: Progressing  Goal: Readiness for Transition of Care  Outcome: Progressing

## 2025-03-12 NOTE — PLAN OF CARE
"Goal Outcome Evaluation:      Plan of Care Reviewed With: patient    Overall Patient Progress: improvingOverall Patient Progress: improving    Outcome Evaluation: Hgb BID    /66 (Patient Position: Standing)   Pulse 68   Temp 97.9  F (36.6  C) (Oral)   Resp 18   Ht 1.829 m (6')   Wt 71.7 kg (158 lb 1.1 oz)   SpO2 93%   BMI 21.44 kg/m      On RA.    Pertinent Assessments: A/Ox4. Denies pain. Up IND. Voiding via BR. Only gas reported this shift, but was previously reporting loose/dark stools.   Major Shift Events: None  Treatment Plan: GI following. Needs repeat EGD to band additional varices although date/time TBD. Full liquid diet. Nephrology signed off and will f/up in clinic. Transfuse PRN. Q12 Hgb ordered, next at 1800.         Problem: Adult Inpatient Plan of Care  Goal: Plan of Care Review  Description: The Plan of Care Review/Shift note should be completed every shift.  The Outcome Evaluation is a brief statement about your assessment that the patient is improving, declining, or no change.  This information will be displayed automatically on your shift  note.  Outcome: Progressing  Flowsheets (Taken 3/12/2025 1325)  Outcome Evaluation: Hgb BID  Plan of Care Reviewed With: patient  Overall Patient Progress: improving  Goal: Patient-Specific Goal (Individualized)  Description: You can add care plan individualizations to a care plan. Examples of Individualization might be:  \"Parent requests to be called daily at 9am for status\", \"I have a hard time hearing out of my right ear\", or \"Do not touch me to wake me up as it startles  me\".  Outcome: Progressing  Goal: Absence of Hospital-Acquired Illness or Injury  Outcome: Progressing  Intervention: Identify and Manage Fall Risk  Recent Flowsheet Documentation  Taken 3/12/2025 1324 by Shereen Mercer, RN  Safety Promotion/Fall Prevention: safety round/check completed  Taken 3/12/2025 0922 by Shereen Mercer, RN  Safety Promotion/Fall Prevention:   " clutter free environment maintained   patient and family education   nonskid shoes/slippers when out of bed   safety round/check completed  Intervention: Prevent Skin Injury  Recent Flowsheet Documentation  Taken 3/12/2025 0922 by Shereen Mercer, RN  Body Position: position changed independently  Goal: Optimal Comfort and Wellbeing  Outcome: Progressing  Goal: Readiness for Transition of Care  Outcome: Progressing     Problem: Skin Injury Risk Increased  Goal: Skin Health and Integrity  Outcome: Progressing  Intervention: Optimize Skin Protection  Recent Flowsheet Documentation  Taken 3/12/2025 0922 by Shereen Mercer, RN  Activity Management: activity adjusted per tolerance     Problem: Nausea and Vomiting  Goal: Nausea and Vomiting Relief  Outcome: Progressing

## 2025-03-12 NOTE — PROGRESS NOTES
[facility]    Medicine Progress Note - Hospitalist Service    Date of Admission:  3/8/2025    Assessment & Plan   Willi Lares is a 30 year old male admitted on 3/8/2025. He has medical history of azoospermia and family history of polycystic kidney disease.  He presented to the ED with complaints of vomiting blood and also having dark watery bowel movements.  ED evaluation showed stable vital signs. Laboratory evaluation showed HGB 10.3. CT showed new cirrhosis, polycystic kidneys, and evidence of varices.  Hemoglobin dipped to 7.5 early in admission for which 1 unit of PRBCs was transfused.  GI consulted and he had EGD with banding on 3/9 with plan for repeat EGD prior to discharge.  He was also noted to have innumerable benign cysts in both kidneys worrisome for PCKD. This would also be a new diagnosis for patient, although less surprising with his family history. Nephrology was consulted for initial assessment of this new diagnosis. Hemoglobin drifted down to 6.8 on 3/11 for which a second unit of RBCs was transfused.      Problem list:      Upper GI bleed  Gastroesophageal varices s/p banding  Hematemesis  Acute blood loss anemia  -S/p EGD 3/9 with large varices, multiple banded but will need repeat EGD for additional management   -Some ongoing HGB decline; possibly due to oozing from banded and deteriorating vessels   -S/p transfusion of 1 unit of RBCs on 3/9 for HGB 7.5  -S/p transfusion of 1  unit of RBC's on 3/11 for HGB 6.8  -HGB kb to 9.1 after transfusion and then drifted to 7.4 the next morning.  -Continue IV Protonix twice daily  -Discontinue octreotide infusion as it has been 72 hours  -Continue HGB monitoring  -Conditional order to transfuse for HGB of 7 or less  -GI not planning to repeat EGD in the short term unless signs of significant UGIB      Cirrhosis  -New diagnosis.  CT and ultrasound imaging consistent with cirrhosis.  No clear risk factors, denies heavy alcohol  use.  Does have polycystic kidney disease in the family, but no visible cysts on liver on imaging.  Viral and autoimmune workup per GI.  -Follow-up viral, autoimmune workup for cirrhosis  -[facility] following, appreciate assistance  -Will need outpatient follow up     Polycystic kidney disease  -Family history of polycystic kidney disease  -Patient was not previously diagnosed with PCKD  -Nephrology consult appreciated  -Will need outpatient follow up          Diet: Full Liquid Diet    DVT Prophylaxis: Pneumatic Compression Devices  Kumari Catheter: Not present  Lines: None     Cardiac Monitoring: None  Code Status: Full Code      Clinically Significant Risk Factors          # Hyperchloremia: Highest Cl = 112 mmol/L in last 2 days, will monitor as appropriate      # Hypocalcemia: Lowest Ca = 7.6 mg/dL in last 2 days, will monitor and replace as appropriate     # Hypoalbuminemia: Lowest albumin = 3.2 g/dL at 3/10/2025  7:34 AM, will monitor as appropriate   # Thrombocytopenia: Lowest platelets = 70 in last 2 days, will monitor for bleeding                         Social Drivers of Health            Disposition Plan     Medically Ready for Discharge: Anticipated in 2-4 Days             Guanako Dalal MD  Hospitalist Service  [facility]  Securely message with [employer] (more info)  Text page via [employer] Paging/Directory   ______________________________________________________________________    Interval History   Continues to notice decrease in amount of old blood in stool.  No other obvious bleeding.    Physical Exam   Vital Signs: Temp: 97.9  F (36.6  C) Temp src: Oral BP: 107/66 Pulse: 68   Resp: 18 SpO2: 93 % O2 Device: None (Room air)    Weight: 158 lbs 1.12 oz    GENERAL:  Comfortable. Cooperative.  PSYCH: pleasant, oriented, No acute distress.  EYES: PERRLA, Normal conjunctiva.  HEART:  Regular rate and rhythm. No JVD. Pulses normal. No edema.  LUNGS:  Clear to auscultation, normal Respiratory  effort.  ABDOMEN:  Soft, no hepatosplenomegaly, normal bowel sounds.  EXTREMETIES: No clubbing, cyanosis or ischemia  SKIN:  Dry to touch, No rash.      Medical Decision Making       40 MINUTES SPENT BY ME on the date of service doing chart review, history, exam, documentation & further activities per the note.      Data     I have personally reviewed the following data over the past 24 hrs:    N/A  \   7.4 (L)   / N/A     N/A N/A N/A /  N/A   N/A N/A N/A \       Imaging results reviewed over the past 24 hrs:   No results found for this or any previous visit (from the past 24 hours).  Recent Labs   Lab 03/12/25  0638 03/11/25  1826 03/11/25  0642 03/10/25  1424 03/10/25  0734 03/09/25  1210 03/09/25  0807 03/08/25  1722 03/08/25  1400   WBC  --   --  3.1*  --  5.5  --  6.0  --  8.9   HGB 7.4* 9.1* 6.8*   < > 7.7*  7.7*   < > 7.5*   < > 10.3*   MCV  --   --  91  --  91  --  90  --  90   PLT  --   --  70*  --  85*  --  98*  --  151   INR  --   --   --   --   --   --   --   --  1.26*   NA  --   --  143  --  142  --  141  --  140   POTASSIUM  --   --  4.0  --  4.4  --  4.3  --  3.5   CHLORIDE  --   --  112*  --  110*  --  111*  --  107   CO2  --   --  26  --  26  --  26  --  24   BUN  --   --  21.5*  --  28.2*  --  35.0*  --  39.5*   CR  --   --  0.83  --  0.83  --  0.98  --  0.74   ANIONGAP  --   --  5*  --  6*  --  4*  --  9   LIN  --   --  7.6*  --  7.9*  --  7.9*  --  8.3*   GLC  --   --  101*  --  121*  --  140*  --  150*   ALBUMIN  --   --  3.4*  --  3.2*  --   --   --  4.0   PROTTOTAL  --   --  4.6*  --  5.0*  --   --   --  6.0*   BILITOTAL  --   --  0.5  --  0.7  --   --   --  1.0   ALKPHOS  --   --  32*  --  33*  --   --   --  47   ALT  --   --  25  --  30  --   --   --  35   AST  --   --  26  --  27  --   --   --  32    < > = values in this interval not displayed.

## 2025-03-12 NOTE — PROGRESS NOTES
GASTROENTEROLOGY PROGRESS NOTE     INTERVAL HISTORY   2 BM yesterday, has not passed any substantial stool today     OBJECTIVE:  General Appearance:  Male pt resting in hospital bed, NAD  /66 (Patient Position: Standing)   Pulse 68   Temp 97.9  F (36.6  C) (Oral)   Resp 18   Ht 1.829 m (6')   Wt 71.7 kg (158 lb 1.1 oz)   SpO2 93%   BMI 21.44 kg/m    Temp (24hrs), Av.2  F (36.8  C), Min:97.9  F (36.6  C), Max:98.6  F (37  C)    No data found.    Intake/Output Summary (Last 24 hours) at 3/12/2025 1153  Last data filed at 3/12/2025 1123  Gross per 24 hour   Intake 660 ml   Output 1500 ml   Net -840 ml        PHYSICAL EXAM     Constitutional: alert and no distress   Respiratory: Comfortable breathing pattern   Abdomen: Soft, non-tender, + bowel sounds            Additional Comments:  ROS, FH, SH: See initial GI consult for details.     I have reviewed the patient's new clinical lab results:     Recent Labs   Lab Test 25  0638 25  1826 25  0642 03/10/25  1424 03/10/25  0734 25  1210 25  0807 25  1722 25  1400   WBC  --   --  3.1*  --  5.5  --  6.0  --  8.9   HGB 7.4* 9.1* 6.8*   < > 7.7*  7.7*   < > 7.5*   < > 10.3*   MCV  --   --  91  --  91  --  90  --  90   PLT  --   --  70*  --  85*  --  98*  --  151   INR  --   --   --   --   --   --   --   --  1.26*    < > = values in this interval not displayed.     Recent Labs   Lab Test 25  0642 03/10/25  0734 25  0807   POTASSIUM 4.0 4.4 4.3   CHLORIDE 112* 110* 111*   CO2 26 26 26   BUN 21.5* 28.2* 35.0*   ANIONGAP 5* 6* 4*     Recent Labs   Lab Test 25  0642 03/10/25  0734 25  1400   ALBUMIN 3.4* 3.2* 4.0   BILITOTAL 0.5 0.7 1.0   ALT 25 30 35   AST 26 27 32      IMAGING     CTA ABDOMEN PELVIS w/IV contrast 3/8/25  IMPRESSION:  1.  No evidence of active gastrointestinal bleeding.  2.  Cirrhotic liver with features of portal hypertension including splenomegaly, a small amount of ascites, and  varices including extensive gastroesophageal varices.  3.  Mild wall thickening of the ascending and proximal transverse colon is favored to reflect portal colopathy, although a mild colitis would appear similar.  4.  Innumerable benign cysts throughout both kidneys, which do not require follow-up.  5.  Bilateral nonobstructing intrarenal calculi measuring up to 4 mm on both sides.  6.  Fluid distends both seminal vesicles, likely related to the recent transurethral resection of the ejaculatory ducts. No abscess.     US ABDOMEN 3/8/2025  IMPRESSION:  1.  Morphologic changes of cirrhosis. Trace ascites.  2.  Normal Doppler evaluation of the hepatic vasculature.  3.  Mild gallbladder wall thickening, nonspecific and may be related to cirrhosis. No cholelithiasis or biliary dilatation.    ENDOSCOPIC EVALUATION:  EGD 3/9/2025 (Dr. Almeida)  Findings:       Large (> 5 mm) varices with stigmata of recent bleeding were found in        the lower third of the esophagus. Red cristina signs were present. Six bands        were successfully placed with incomplete eradication of varices. There        was no bleeding at the end of the procedure.        Type 1 gastroesophageal varices (GOV1, esophageal varices which extend        along the lesser curvature) with no bleeding were found in the cardia.        There were no stigmata of recent bleeding. They were medium in largest        diameter.        Moderate portal hypertensive gastropathy was found in the entire        examined stomach.        The duodenal bulb, first portion of the duodenum and second portion of        the duodenum were normal.                                                                                    Impression:         - Large (> 5 mm) esophageal varices with stigmata                             of recent bleeding and extensive red cristina signs.                             Incompletely eradicated. Banded.                             - Type 1 gastroesophageal  varices (GOV1, esophageal                             varices which extend along the lesser curvature),                             without bleeding.                             - Portal hypertensive gastropathy.                             - Normal duodenal bulb, first portion of the                             duodenum and second portion of the duodenum.                             - No specimens collected.       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 CT with cirrhotic appearing liver with features of portal HTN (splenomegaly, small amount ascites, varices).     Etiology of cirrhosis unclear. Denies alcohol use. No family history of liver disease.     HCV antibody - non reactive   HBV S Ab, HBV S Ag, HBV C Ab - non reactive, shows immunity due to vaccine  Mitochondrial M2 Antibody IgG - Negative  MARK - Negative  F Actin EIA with reflex - Negative  Ceruloplasmin - normal  Iron panel - low iron and iron binding capacity  Ferritin - normal  Celiac - negative   Alpha-1-antitrypsin - pending  Schistosoma - pending       EGD 3/9 with large varices- banded and portal hypertensive gastropathy    3/11 Drop in hgb to 6.8 No overt bleeding- transfused 1 U PRBC- hgb 3/12 with incomplete response to 7.4.  Repeat hgb not yet drawn.  Will need eventual repeat EGD for variceal follow up. With no overt bleeding no immediate plans for further endoscopic evaluation.       PLAN  Full liquid diet   Octreotide x 72 hours   Ceftriaxone for GIB PPx  PPI BID   Monitor hgb, stool, transfuse PRN  Eventual EGD for Variceal follow up    Hepatology follow up on discharge ([facility] will arrange)          I will update Dr. Polk, GI staff         Time spent: 17  minutes, greater than 50% of the visit was spent in counseling/coordination of care.     Kristan Stallings PA-C  [facility] ([facility])  Office: [phone]

## 2025-03-12 NOTE — PLAN OF CARE
"Pt a&ox4, VSS, no complaints of pain. Hgb at 1800 is 9.1. Pt has intermittent dizziness at time with positional change. Up to chair this afternoon. NS running at 100ml/hr. On Rocephin IV. Independent in room.    Problem: Adult Inpatient Plan of Care  Goal: Plan of Care Review  Description: The Plan of Care Review/Shift note should be completed every shift.  The Outcome Evaluation is a brief statement about your assessment that the patient is improving, declining, or no change.  This information will be displayed automatically on your shift  note.  Outcome: Progressing  Flowsheets (Taken 3/12/2025 1851)  Outcome Evaluation: pt alert and oriented x4, VSS, no complaints of pain. monitoring hgb.  Plan of Care Reviewed With: patient  Overall Patient Progress: improving  Goal: Patient-Specific Goal (Individualized)  Description: You can add care plan individualizations to a care plan. Examples of Individualization might be:  \"Parent requests to be called daily at 9am for status\", \"I have a hard time hearing out of my right ear\", or \"Do not touch me to wake me up as it startles  me\".  Outcome: Progressing  Goal: Absence of Hospital-Acquired Illness or Injury  Outcome: Progressing  Intervention: Prevent Skin Injury  Recent Flowsheet Documentation  Taken 3/12/2025 1729 by Holly Odell RN  Body Position: position changed independently  Intervention: Prevent and Manage VTE (Venous Thromboembolism) Risk  Recent Flowsheet Documentation  Taken 3/12/2025 1729 by Holly Odell RN  VTE Prevention/Management: SCDs off (sequential compression devices)  Intervention: Prevent Infection  Recent Flowsheet Documentation  Taken 3/12/2025 1729 by Holly Odell RN  Infection Prevention:   rest/sleep promoted   single patient room provided  Goal: Optimal Comfort and Wellbeing  Outcome: Progressing  Goal: Readiness for Transition of Care  Outcome: Progressing     Problem: Nausea and Vomiting  Goal: Nausea and Vomiting Relief  Outcome: " Progressing     Problem: Skin Injury Risk Increased  Goal: Skin Health and Integrity  Outcome: Progressing  Intervention: Optimize Skin Protection  Recent Flowsheet Documentation  Taken 3/12/2025 1729 by Holly Odell, RN  Head of Bed (HOB) Positioning: HOB at 60-90 degrees   Goal Outcome Evaluation:      Plan of Care Reviewed With: patient    Overall Patient Progress: improvingOverall Patient Progress: improving    Outcome Evaluation: pt alert and oriented x4, VSS, no complaints of pain. monitoring hgb.

## 2025-03-13 VITALS
HEIGHT: 72 IN | SYSTOLIC BLOOD PRESSURE: 103 MMHG | WEIGHT: 158.07 LBS | OXYGEN SATURATION: 98 % | BODY MASS INDEX: 21.41 KG/M2 | HEART RATE: 63 BPM | TEMPERATURE: 98.3 F | DIASTOLIC BLOOD PRESSURE: 57 MMHG | RESPIRATION RATE: 16 BRPM

## 2025-03-13 LAB
HGB BLD-MCNC: 7.6 G/DL (ref 13.3–17.7)
HGB BLD-MCNC: 9 G/DL (ref 13.3–17.7)

## 2025-03-13 PROCEDURE — 99232 SBSQ HOSP IP/OBS MODERATE 35: CPT | Performed by: INTERNAL MEDICINE

## 2025-03-13 PROCEDURE — 36415 COLL VENOUS BLD VENIPUNCTURE: CPT | Performed by: INTERNAL MEDICINE

## 2025-03-13 PROCEDURE — 85018 HEMOGLOBIN: CPT | Performed by: STUDENT IN AN ORGANIZED HEALTH CARE EDUCATION/TRAINING PROGRAM

## 2025-03-13 PROCEDURE — 85018 HEMOGLOBIN: CPT | Performed by: INTERNAL MEDICINE

## 2025-03-13 PROCEDURE — 250N000011 HC RX IP 250 OP 636: Performed by: INTERNAL MEDICINE

## 2025-03-13 PROCEDURE — 36415 COLL VENOUS BLD VENIPUNCTURE: CPT | Performed by: STUDENT IN AN ORGANIZED HEALTH CARE EDUCATION/TRAINING PROGRAM

## 2025-03-13 PROCEDURE — 258N000003 HC RX IP 258 OP 636: Performed by: INTERNAL MEDICINE

## 2025-03-13 PROCEDURE — 250N000013 HC RX MED GY IP 250 OP 250 PS 637: Performed by: INTERNAL MEDICINE

## 2025-03-13 PROCEDURE — 120N000001 HC R&B MED SURG/OB

## 2025-03-13 RX ORDER — CARVEDILOL 3.12 MG/1
3.12 TABLET ORAL 2 TIMES DAILY WITH MEALS
Status: DISCONTINUED | OUTPATIENT
Start: 2025-03-13 | End: 2025-03-14 | Stop reason: HOSPADM

## 2025-03-13 RX ORDER — PANTOPRAZOLE SODIUM 40 MG/1
40 TABLET, DELAYED RELEASE ORAL
Status: DISCONTINUED | OUTPATIENT
Start: 2025-03-14 | End: 2025-03-14 | Stop reason: HOSPADM

## 2025-03-13 RX ORDER — MULTIPLE VITAMINS W/ MINERALS TAB 9MG-400MCG
1 TAB ORAL DAILY
Status: DISCONTINUED | OUTPATIENT
Start: 2025-03-13 | End: 2025-03-14 | Stop reason: HOSPADM

## 2025-03-13 RX ADMIN — Medication 1 TABLET: at 14:46

## 2025-03-13 RX ADMIN — SODIUM CHLORIDE: 0.9 INJECTION, SOLUTION INTRAVENOUS at 04:18

## 2025-03-13 RX ADMIN — SODIUM CHLORIDE: 0.9 INJECTION, SOLUTION INTRAVENOUS at 14:07

## 2025-03-13 RX ADMIN — PANTOPRAZOLE SODIUM 40 MG: 40 INJECTION, POWDER, FOR SOLUTION INTRAVENOUS at 08:41

## 2025-03-13 RX ADMIN — CARVEDILOL 3.12 MG: 3.12 TABLET, FILM COATED ORAL at 18:18

## 2025-03-13 ASSESSMENT — ACTIVITIES OF DAILY LIVING (ADL)
ADLS_ACUITY_SCORE: 39

## 2025-03-13 NOTE — PROGRESS NOTES
GASTROENTEROLOGY PROGRESS NOTE     INTERVAL HISTORY   1 dark loose stool charted today  Vitals stable. Hungry.     OBJECTIVE:  General Appearance:  Male pt resting in hospital bed, NAD  /86 (BP Location: Right arm)   Pulse 60   Temp 98.4  F (36.9  C) (Oral)   Resp 16   Ht 1.829 m (6')   Wt 71.7 kg (158 lb 1.1 oz)   SpO2 100%   BMI 21.44 kg/m           PHYSICAL EXAM     Constitutional: alert and no distress   Respiratory: Comfortable breathing pattern   Abdomen: Soft, non-tender, + bowel sounds         Additional Comments:  ROS, FH, SH: See initial GI consult for details.     I have reviewed the patient's new clinical lab results:     Recent Labs   Lab Test 03/13/25  0636 03/12/25  1756 03/12/25  0638 03/11/25  1826 03/11/25  0642 03/10/25  1424 03/10/25  0734 03/09/25  1210 03/09/25  0807 03/08/25  1722 03/08/25  1400   WBC  --   --   --   --  3.1*  --  5.5  --  6.0  --  8.9   HGB 7.6* 9.1* 7.4*   < > 6.8*   < > 7.7*  7.7*   < > 7.5*   < > 10.3*   MCV  --   --   --   --  91  --  91  --  90  --  90   PLT  --   --   --   --  70*  --  85*  --  98*  --  151   INR  --   --   --   --   --   --   --   --   --   --  1.26*    < > = values in this interval not displayed.     Recent Labs   Lab Test 03/11/25  0642 03/10/25  0734 03/09/25  0807   POTASSIUM 4.0 4.4 4.3   CHLORIDE 112* 110* 111*   CO2 26 26 26   BUN 21.5* 28.2* 35.0*   ANIONGAP 5* 6* 4*     Recent Labs   Lab Test 03/11/25  0642 03/10/25  0734 03/08/25  1400   ALBUMIN 3.4* 3.2* 4.0   BILITOTAL 0.5 0.7 1.0   ALT 25 30 35   AST 26 27 32      IMAGING     CTA ABDOMEN PELVIS w/IV contrast 3/8/25  IMPRESSION:  1.  No evidence of active gastrointestinal bleeding.  2.  Cirrhotic liver with features of portal hypertension including splenomegaly, a small amount of ascites, and varices including extensive gastroesophageal varices.  3.  Mild wall thickening of the ascending and proximal transverse colon is favored to reflect portal colopathy, although a mild  colitis would appear similar.  4.  Innumerable benign cysts throughout both kidneys, which do not require follow-up.  5.  Bilateral nonobstructing intrarenal calculi measuring up to 4 mm on both sides.  6.  Fluid distends both seminal vesicles, likely related to the recent transurethral resection of the ejaculatory ducts. No abscess.     US ABDOMEN 3/8/2025  IMPRESSION:  1.  Morphologic changes of cirrhosis. Trace ascites.  2.  Normal Doppler evaluation of the hepatic vasculature.  3.  Mild gallbladder wall thickening, nonspecific and may be related to cirrhosis. No cholelithiasis or biliary dilatation.    ENDOSCOPIC EVALUATION:  EGD 3/9/2025 (Dr. Almeida)  Findings:       Large (> 5 mm) varices with stigmata of recent bleeding were found in        the lower third of the esophagus. Red cristina signs were present. Six bands        were successfully placed with incomplete eradication of varices. There        was no bleeding at the end of the procedure.        Type 1 gastroesophageal varices (GOV1, esophageal varices which extend        along the lesser curvature) with no bleeding were found in the cardia.        There were no stigmata of recent bleeding. They were medium in largest        diameter.        Moderate portal hypertensive gastropathy was found in the entire        examined stomach.        The duodenal bulb, first portion of the duodenum and second portion of        the duodenum were normal.                                                                                    Impression:         - Large (> 5 mm) esophageal varices with stigmata                             of recent bleeding and extensive red cristina signs.                             Incompletely eradicated. Banded.                             - Type 1 gastroesophageal varices (GOV1, esophageal                             varices which extend along the lesser curvature),                             without bleeding.                             - Portal  hypertensive gastropathy.                             - Normal duodenal bulb, first portion of the                             duodenum and second portion of the duodenum.                             - No specimens collected.       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 CT with cirrhotic appearing liver with features of portal HTN (splenomegaly, small amount ascites, varices).     Etiology of cirrhosis unclear. Denies alcohol use. No family history of liver disease.     HCV antibody - non reactive   HBV S Ab, HBV S Ag, HBV C Ab - non reactive, shows immunity due to vaccine  Mitochondrial M2 Antibody IgG - Negative  MARK - Negative  F Actin EIA with reflex - Negative  Ceruloplasmin - normal  Iron panel - low iron and iron binding capacity  Ferritin - normal  Celiac - negative   Alpha-1-antitrypsin - pending  Schistosoma - pending       EGD 3/9 with large varices- banded and portal hypertensive gastropathy    3/11 Drop in hgb to 6.8 No overt bleeding- transfused 1 U PRBC- hgb 3/12 with incomplete response to 7.4. hgb on recheck was 9.1 3/12, 7.6 today.  Will need eventual repeat EGD for variceal follow up. With no overt bleeding no immediate plans for further endoscopic evaluation.       PLAN  OK to advance diet to 2gm NA   Octreotide x 72 hours completed   Ceftriaxone for GIB PPx  PPI BID   Alpha 1 antitrypsin/Schistosoma pending   Monitor hgb, stool, transfuse PRN  Eventual EGD for Variceal follow up    Hepatology follow up on discharge ([facility] will arrange)     I will update Dr. Polk, GI staff     Su Vasquez CNP  [facility]  Cell: [phone]  Office: [phone]

## 2025-03-13 NOTE — PLAN OF CARE
Goal Outcome Evaluation:      Plan of Care Reviewed With: patient    Outcome Evaluation: Pt A&O. Denies pain. Denies N/V. Tolerating 2gmNA diet. Transfers ind. AUO. One BM this shift- dark brown.      Problem: Adult Inpatient Plan of Care  Goal: Plan of Care Review  Description: The Plan of Care Review/Shift note should be completed every shift.  The Outcome Evaluation is a brief statement about your assessment that the patient is improving, declining, or no change.  This information will be displayed automatically on your shift  note.  Outcome: Progressing  Flowsheets (Taken 3/13/2025 1802)  Outcome Evaluation: Pt A&O. Denies pain. Denies N/V. Tolerating 2gmNA diet. Transfers ind. AUO. One BM this shift- dark brown.  Plan of Care Reviewed With: patient  Goal: Absence of Hospital-Acquired Illness or Injury  Outcome: Progressing  Intervention: Identify and Manage Fall Risk  Recent Flowsheet Documentation  Taken 3/13/2025 0851 by Abbey Cazares RN  Safety Promotion/Fall Prevention:   clutter free environment maintained   increased rounding and observation   increase visualization of patient   lighting adjusted   mobility aid in reach   nonskid shoes/slippers when out of bed   safety round/check completed  Intervention: Prevent Skin Injury  Recent Flowsheet Documentation  Taken 3/13/2025 1502 by Abbey Cazares RN  Body Position: position changed independently  Taken 3/13/2025 0846 by Abbey Cazares RN  Body Position: position changed independently  Taken 3/13/2025 0838 by Abbey Cazares RN  Body Position: position changed independently  Intervention: Prevent and Manage VTE (Venous Thromboembolism) Risk  Recent Flowsheet Documentation  Taken 3/13/2025 0851 by Abbey Cazares RN  VTE Prevention/Management:   SCDs off (sequential compression devices)   patient refused intervention  Intervention: Prevent Infection  Recent Flowsheet Documentation  Taken 3/13/2025 0851 by Abbey Cazares RN  Infection  Prevention:   rest/sleep promoted   single patient room provided  Goal: Optimal Comfort and Wellbeing  Outcome: Progressing  Goal: Readiness for Transition of Care  Outcome: Progressing     Problem: Nausea and Vomiting  Goal: Nausea and Vomiting Relief  Outcome: Progressing     Problem: Skin Injury Risk Increased  Goal: Skin Health and Integrity  Outcome: Progressing  Intervention: Plan: Nurse Driven Intervention: Moisture Management  Recent Flowsheet Documentation  Taken 3/13/2025 0851 by Abbey Cazares, RN  Moisture Interventions: Encourage regular toileting  Intervention: Optimize Skin Protection  Recent Flowsheet Documentation  Taken 3/13/2025 1502 by Abbey Cazares, RN  Activity Management: ambulated to bathroom  Head of Bed (HOB) Positioning: HOB at 20-30 degrees  Taken 3/13/2025 0851 by Abbey Cazares, RN  Activity Management: ambulated to bathroom  Taken 3/13/2025 0846 by Abbey Cazares, RN  Activity Management: activity adjusted per tolerance  Head of Bed (HOB) Positioning: HOB at 20-30 degrees  Taken 3/13/2025 0838 by Abbey Cazares, RN  Activity Management: activity adjusted per tolerance  Head of Bed (HOB) Positioning: HOB at 20-30 degrees

## 2025-03-13 NOTE — PLAN OF CARE
"Goal Outcome Evaluation:      Plan of Care Reviewed With: patient    Overall Patient Progress: improvingOverall Patient Progress: improving    Outcome Evaluation: Pt alert and orientated. on RA. denies pain. Denies dizziness. up ind in room. IV infusing NS @ 100ml. Tolerating full liquid diet. Awaiting discharge plans    Temp: 98  F (36.7  C) Temp src: Oral BP: 106/61 Pulse: 60   Resp: 16 SpO2: 96 % O2 Device: None (Room air)         Problem: Adult Inpatient Plan of Care  Goal: Plan of Care Review  Description: The Plan of Care Review/Shift note should be completed every shift.  The Outcome Evaluation is a brief statement about your assessment that the patient is improving, declining, or no change.  This information will be displayed automatically on your shift  note.  Outcome: Progressing  Flowsheets (Taken 3/13/2025 0503)  Outcome Evaluation: Pt alert and orientated. on RA. denies pain. Denies dizziness. up ind in room. IV infusing NS @ 100ml. Tolerating full liquid diet. Awaiting discharge plans  Plan of Care Reviewed With: patient  Overall Patient Progress: improving  Goal: Patient-Specific Goal (Individualized)  Description: You can add care plan individualizations to a care plan. Examples of Individualization might be:  \"Parent requests to be called daily at 9am for status\", \"I have a hard time hearing out of my right ear\", or \"Do not touch me to wake me up as it startles  me\".  Outcome: Progressing  Goal: Absence of Hospital-Acquired Illness or Injury  Outcome: Progressing  Intervention: Identify and Manage Fall Risk  Recent Flowsheet Documentation  Taken 3/12/2025 2000 by Celeste Ordonez RN  Safety Promotion/Fall Prevention:   safety round/check completed   nonskid shoes/slippers when out of bed  Intervention: Prevent Skin Injury  Recent Flowsheet Documentation  Taken 3/12/2025 1900 by Celeste Ordonez RN  Body Position: position changed independently  Intervention: Prevent and Manage VTE (Venous " Thromboembolism) Risk  Recent Flowsheet Documentation  Taken 3/12/2025 2000 by Celeste Ordonez RN  VTE Prevention/Management: SCDs off (sequential compression devices)  Intervention: Prevent Infection  Recent Flowsheet Documentation  Taken 3/12/2025 2000 by Celeste Ordonez RN  Infection Prevention:   rest/sleep promoted   hand hygiene promoted  Goal: Optimal Comfort and Wellbeing  Outcome: Progressing  Goal: Readiness for Transition of Care  Outcome: Progressing     Problem: Nausea and Vomiting  Goal: Nausea and Vomiting Relief  Outcome: Progressing  Intervention: Prevent and Manage Nausea and Vomiting  Recent Flowsheet Documentation  Taken 3/12/2025 1900 by Celeste Ordonez RN  Oral Care:   teeth brushed   tongue brushed     Problem: Skin Injury Risk Increased  Goal: Skin Health and Integrity  Outcome: Progressing  Intervention: Plan: Nurse Driven Intervention: Moisture Management  Recent Flowsheet Documentation  Taken 3/12/2025 2000 by Celeste Ordonez RN  Moisture Interventions: Encourage regular toileting  Intervention: Optimize Skin Protection  Recent Flowsheet Documentation  Taken 3/12/2025 1900 by Celeste Ordonez RN  Activity Management:   ambulated to bathroom   back to bed  Head of Bed (HOB) Positioning: HOB at 20-30 degrees

## 2025-03-13 NOTE — PROGRESS NOTES
CLINICAL NUTRITION SERVICES - ASSESSMENT NOTE    RECOMMENDATIONS FOR MDs/PROVIDERS TO ORDER:  none    Malnutrition Status:    Malnutrition Diagnosis: Moderate malnutrition in the context of acute illness or injury  Malnutrition Present on Admission: No     Registered Dietitian Interventions:  Ensure Enlive TID as bridge to diet advance  Provided nutrition education for cirrhosis (handout provided) and Heart Healthy Eating (which is similar to recommendations for PCKD)  Start MVI/min    Future/Additional Recommendations:  Monitor diet advance, po intake, weight, labs.      REASON FOR ASSESSMENT  LOS Day 5    SUBJECTIVE INFORMATION  Assessed patient in room.    NUTRITION HISTORY  Pt reports he was eating his usual diet up until the day of admission.  His appetite was fine and no GI concerns until the N and V began which came on suddenly.  Pt reports he is , his wife does most of the cooking.  He eats a mixture of processed foods, fast foods and home cooked meals.      Here, pt has been NPO until yesterday.  Diet is now FL and he states he would like to have more than that.      CURRENT NUTRITION ORDERS  Diet: Orders Placed This Encounter      Full Liquid Diet    CURRENT INTAKE/TOLERANCE  Hungry.  But only on FL now due to Hgb levels and concern for additional bleeding.     LABS  Nutrition-relevant labs:    Latest Reference Range & Units 03/11/25 06:42   Sodium 135 - 145 mmol/L 143   Potassium 3.4 - 5.3 mmol/L 4.0   Chloride 98 - 107 mmol/L 112 (H)   Carbon Dioxide (CO2) 22 - 29 mmol/L 26   Urea Nitrogen 6.0 - 20.0 mg/dL 21.5 (H)   Creatinine 0.67 - 1.17 mg/dL 0.83   GFR Estimate >60 mL/min/1.73m2 >90   Calcium 8.8 - 10.4 mg/dL 7.6 (L)   Anion Gap 7 - 15 mmol/L 5 (L)   Albumin 3.5 - 5.2 g/dL 3.4 (L)   Protein Total 6.4 - 8.3 g/dL 4.6 (L)   Alkaline Phosphatase 40 - 150 U/L 32 (L)   (H): Data is abnormally high  (L): Data is abnormally low    MEDICATIONS  Nutrition-relevant medications:  "    ANTHROPOMETRICS  Height: 182.9 cm (6' 0\")  Most Recent Weight: 71.7 kg (158 lb 1.1 oz)  IBW: 77.6 kg  UBW: pt states he has always been thin.  His UBW has been approx 68 kg but has gained some in the past year.  BMI (kg/m ): Normal BMI  Weight History:   Wt Readings from Last 5 Encounters:   03/08/25 71.7 kg (158 lb 1.1 oz)   12/13/24 71.7 kg (158 lb)   11/15/24 71.7 kg (158 lb)   09/26/24 71.7 kg (158 lb)   02/09/23 70.3 kg (155 lb)     Dosing Weight: 71.7 kg, based on actual wt    ASSESSED NUTRITION NEEDS  Estimated Energy Needs: 30 - 35 kcals/kg  Justification: Maintenance, active, repletion  Estimated Protein Needs: 1.2 - 1.5 grams of pro/kg  Justification: Maintenance, cirrhosis,   Estimated Fluid Needs: 1 mL/kcal  Justification: Per provider pending fluid status    SYSTEM FINDINGS    Skin/wounds: no documented PI  GI symptoms: has had old blood in stools.  One loose stool yesterday.    MALNUTRITION  % Intake: </= 50% for >/= 5 days (severe)  % Weight Loss: None noted  Subcutaneous Fat Loss: None observed  Muscle Loss: Temples (temporalis muscle): Mild and Clavicles (pectoralis and deltoids): Mild  Fluid Accumulation/Edema: None noted  Malnutrition Diagnosis: Moderate malnutrition in the context of acute illness or injury  Malnutrition Present on Admission: No    NUTRITION DIAGNOSIS  Inadequate oral intake related to NPO and FL status as evidenced by diet orders.    INTERVENTIONS  Medical food supplement therapy  Nutrition counseling strategies  Nutrition education application    Goals  Demonstrates understanding of nutrition education  Patient to consume % of nutritionally adequate meal trays TID, or the equivalent with supplements/snacks.     Monitoring/Evaluation  Progress toward goals will be monitored and evaluated per policy.    "

## 2025-03-13 NOTE — PROGRESS NOTES
[facility]    Medicine Progress Note - Hospitalist Service    Date of Admission:  3/8/2025    Assessment & Plan   Willi Lares is a 30 year old male admitted on 3/8/2025. He has medical history of azoospermia and family history of polycystic kidney disease.  He presented to the ED with complaints of vomiting blood and also having dark watery bowel movements.  ED evaluation showed stable vital signs. Laboratory evaluation showed HGB 10.3. CT showed new cirrhosis, polycystic kidneys, and evidence of varices.  Hemoglobin dipped to 7.5 early in admission for which 1 unit of PRBCs was transfused.  GI consulted and he had EGD with banding on 3/9 with plan for repeat EGD prior to discharge.  He was also noted to have innumerable benign cysts in both kidneys worrisome for PCKD. This would also be a new diagnosis for patient, although less surprising with his family history. Nephrology was consulted for initial assessment of this new diagnosis. Hemoglobin drifted down to 6.8 on 3/11 for which a second unit of RBCs was transfused.  Later in stay patient offered that his family home in which she has lived for most of his life uses well water that has tested higher than normal for arsenic.  He inquired about this as a potential cause for his liver disease, which is an interesting idea.     Problem list:      Upper GI bleed  Gastroesophageal varices s/p banding  Hematemesis  Acute blood loss anemia  -S/p EGD 3/9 with large varices, multiple banded but will need repeat EGD for additional management   -Some ongoing HGB decline; possibly due to oozing from banded and deteriorating vessels   -S/p transfusion of 1 unit of RBCs on 3/9 for HGB 7.5  -S/p transfusion of 1  unit of RBC's on 3/11 for HGB 6.8  -HGB bouncing around some between mid sevens and 9 but no signs of bleeding.  -Change IV Protonix twice daily to oral, daily.  -Stop IV fluids.  -Patient will advance diet tomorrow soft food choices  -AM  CBC  -Conditional order to transfuse for HGB of 7 or less  -GI not planning to repeat EGD in the short term unless signs of significant UGIB   -Anticipate discharge home tomorrow if hemoglobin stable.     Cirrhosis  -New diagnosis.  CT and ultrasound imaging consistent with cirrhosis.  No clear risk factors, denies heavy alcohol use.  Does have polycystic kidney disease in the family, but no visible cysts on liver on imaging.  Viral and autoimmune workup per GI.  -Patient raises the question of possible arsenic toxicity causing his liver disease.  This is interesting and is definitely possible.  He has been in the hospital too long for a urine or blood test to be helpful.  He has lived at his family home for the last year so a hair sample might be helpful.  I spoke with lab today and they are going to obtain a kit for a hair arsenic test.  The kit will likely be here next week.  Patient will come in and collect the kit and bring it back with a hair sample.  The kit will have instructions.  Evidently, he needs about 2-1/2 or so inches of hair and a bundle about the diameter of the pencil.  I will need to order outpatient laboratory (lab 4/9/2009 and enter arsenic, hair under description).  Patient can call [phone] to ensure get has arrived before he comes in.  I have been discussing this with Pina from lab who has been quite helpful.  -Follow-up viral, autoimmune workup for cirrhosis  -[facility] following, appreciate assistance  -Will need outpatient follow up for endoscopy in about 6 weeks and hepatology follow-up.  Anticipate he will eventually need liver biopsy.  -Discussed with GI.  Will start low-dose Coreg, 3.125 mg twice daily with hold parameters for cirrhosis with portal hypertension.     Polycystic kidney disease  -Family history of polycystic kidney disease  -Patient was not previously diagnosed with PCKD  -Nephrology consult appreciated; he will follow-up with [facility] as outpatient          Diet: 2  Gram Sodium Diet  Snacks/Supplements Adult: Ensure Enlive; With Meals    DVT Prophylaxis: Pneumatic Compression Devices  Kumari Catheter: Not present  Lines: None     Cardiac Monitoring: None  Code Status: Full Code      Clinically Significant Risk Factors               # Hypoalbuminemia: Lowest albumin = 3.2 g/dL at 3/10/2025  7:34 AM, will monitor as appropriate                 # Moderate Malnutrition: based on nutrition assessment and treatment provided per dietitian's recommendations.           Social Drivers of Health            Disposition Plan     Medically Ready for Discharge: Anticipated Tomorrow             Guanako Dalal MD  Hospitalist Service  [facility]  Securely message with [employer] (more info)  Text page via [employer] Paging/Directory   ______________________________________________________________________    Interval History   No new problems.    Physical Exam   Vital Signs: Temp: 98  F (36.7  C) Temp src: Oral BP: 110/65 Pulse: 60   Resp: 16 SpO2: 100 % O2 Device: None (Room air)    Weight: 158 lbs 1.12 oz    GENERAL:  Comfortable. Cooperative.  PSYCH: pleasant, oriented, No acute distress.  EYES: PERRLA, Normal conjunctiva.  HEART:  Regular rate and rhythm. No JVD. Pulses normal. No edema.  LUNGS:  Clear to auscultation, normal Respiratory effort.  ABDOMEN:  Soft, no hepatosplenomegaly, normal bowel sounds.  EXTREMETIES: No clubbing, cyanosis or ischemia  SKIN:  Dry to touch, No rash.      Medical Decision Making       45 MINUTES SPENT BY ME on the date of service doing chart review, history, exam, documentation & further activities per the note.      Data     I have personally reviewed the following data over the past 24 hrs:    N/A  \   9.0 (L)   / N/A     N/A N/A N/A /  N/A   N/A N/A N/A \       Imaging results reviewed over the past 24 hrs:   No results found for this or any previous visit (from the past 24 hours).  Recent Labs   Lab 03/13/25  1319 03/13/25  0636  03/12/25  1756 03/11/25  1826 03/11/25  0642 03/10/25  1424 03/10/25  0734 03/09/25  1210 03/09/25  0807 03/08/25  1722 03/08/25  1400   WBC  --   --   --   --  3.1*  --  5.5  --  6.0  --  8.9   HGB 9.0* 7.6* 9.1*   < > 6.8*   < > 7.7*  7.7*   < > 7.5*   < > 10.3*   MCV  --   --   --   --  91  --  91  --  90  --  90   PLT  --   --   --   --  70*  --  85*  --  98*  --  151   INR  --   --   --   --   --   --   --   --   --   --  1.26*   NA  --   --   --   --  143  --  142  --  141  --  140   POTASSIUM  --   --   --   --  4.0  --  4.4  --  4.3  --  3.5   CHLORIDE  --   --   --   --  112*  --  110*  --  111*  --  107   CO2  --   --   --   --  26  --  26  --  26  --  24   BUN  --   --   --   --  21.5*  --  28.2*  --  35.0*  --  39.5*   CR  --   --   --   --  0.83  --  0.83  --  0.98  --  0.74   ANIONGAP  --   --   --   --  5*  --  6*  --  4*  --  9   LIN  --   --   --   --  7.6*  --  7.9*  --  7.9*  --  8.3*   GLC  --   --   --   --  101*  --  121*  --  140*  --  150*   ALBUMIN  --   --   --   --  3.4*  --  3.2*  --   --   --  4.0   PROTTOTAL  --   --   --   --  4.6*  --  5.0*  --   --   --  6.0*   BILITOTAL  --   --   --   --  0.5  --  0.7  --   --   --  1.0   ALKPHOS  --   --   --   --  32*  --  33*  --   --   --  47   ALT  --   --   --   --  25  --  30  --   --   --  35   AST  --   --   --   --  26  --  27  --   --   --  32    < > = values in this interval not displayed.

## 2025-03-14 VITALS
RESPIRATION RATE: 16 BRPM | DIASTOLIC BLOOD PRESSURE: 67 MMHG | TEMPERATURE: 98.1 F | BODY MASS INDEX: 21.41 KG/M2 | OXYGEN SATURATION: 100 % | HEIGHT: 72 IN | SYSTOLIC BLOOD PRESSURE: 113 MMHG | WEIGHT: 158.07 LBS | HEART RATE: 77 BPM

## 2025-03-14 LAB
A1AT PHENOTYP SERPL-IMP: ABNORMAL
A1AT SERPL-MCNC: 72 MG/DL
ANION GAP SERPL CALCULATED.3IONS-SCNC: 7 MMOL/L (ref 7–15)
BUN SERPL-MCNC: 14.1 MG/DL (ref 6–20)
CALCIUM SERPL-MCNC: 8.5 MG/DL (ref 8.8–10.4)
CHLORIDE SERPL-SCNC: 107 MMOL/L (ref 98–107)
CREAT SERPL-MCNC: 0.83 MG/DL (ref 0.67–1.17)
EGFRCR SERPLBLD CKD-EPI 2021: >90 ML/MIN/1.73M2
ERYTHROCYTE [DISTWIDTH] IN BLOOD BY AUTOMATED COUNT: 14.1 % (ref 10–15)
GLUCOSE SERPL-MCNC: 99 MG/DL (ref 70–99)
HCO3 SERPL-SCNC: 26 MMOL/L (ref 22–29)
HCT VFR BLD AUTO: 25.1 % (ref 40–53)
HGB BLD-MCNC: 8.7 G/DL (ref 13.3–17.7)
MCH RBC QN AUTO: 31.1 PG (ref 26.5–33)
MCHC RBC AUTO-ENTMCNC: 34.7 G/DL (ref 31.5–36.5)
MCV RBC AUTO: 90 FL (ref 78–100)
PLATELET # BLD AUTO: 104 10E3/UL (ref 150–450)
POTASSIUM SERPL-SCNC: 3.8 MMOL/L (ref 3.4–5.3)
RBC # BLD AUTO: 2.8 10E6/UL (ref 4.4–5.9)
SODIUM SERPL-SCNC: 140 MMOL/L (ref 135–145)
WBC # BLD AUTO: 2.9 10E3/UL (ref 4–11)

## 2025-03-14 PROCEDURE — 36415 COLL VENOUS BLD VENIPUNCTURE: CPT | Performed by: INTERNAL MEDICINE

## 2025-03-14 PROCEDURE — 80048 BASIC METABOLIC PNL TOTAL CA: CPT | Performed by: INTERNAL MEDICINE

## 2025-03-14 PROCEDURE — 82310 ASSAY OF CALCIUM: CPT | Performed by: INTERNAL MEDICINE

## 2025-03-14 PROCEDURE — 99239 HOSP IP/OBS DSCHRG MGMT >30: CPT | Performed by: INTERNAL MEDICINE

## 2025-03-14 PROCEDURE — 250N000013 HC RX MED GY IP 250 OP 250 PS 637: Performed by: INTERNAL MEDICINE

## 2025-03-14 PROCEDURE — 85027 COMPLETE CBC AUTOMATED: CPT | Performed by: INTERNAL MEDICINE

## 2025-03-14 RX ORDER — PANTOPRAZOLE SODIUM 40 MG/1
40 TABLET, DELAYED RELEASE ORAL
Qty: 30 TABLET | Refills: 1 | Status: SHIPPED | OUTPATIENT
Start: 2025-03-15

## 2025-03-14 RX ORDER — CARVEDILOL 3.12 MG/1
3.12 TABLET ORAL 2 TIMES DAILY WITH MEALS
Qty: 60 TABLET | Refills: 1 | Status: SHIPPED | OUTPATIENT
Start: 2025-03-14

## 2025-03-14 RX ADMIN — CARVEDILOL 3.12 MG: 3.12 TABLET, FILM COATED ORAL at 09:00

## 2025-03-14 RX ADMIN — Medication 1 TABLET: at 09:00

## 2025-03-14 RX ADMIN — PANTOPRAZOLE SODIUM 40 MG: 40 TABLET, DELAYED RELEASE ORAL at 06:56

## 2025-03-14 ASSESSMENT — ACTIVITIES OF DAILY LIVING (ADL)
ADLS_ACUITY_SCORE: 39

## 2025-03-14 NOTE — PLAN OF CARE
"Goal Outcome Evaluation:      Plan of Care Reviewed With: patient    Overall Patient Progress: improvingOverall Patient Progress: improving    Outcome Evaluation: Discharging home. A&Ox4. VSS. Denies pain, nausea or SOB      Problem: Adult Inpatient Plan of Care  Goal: Plan of Care Review  Description: The Plan of Care Review/Shift note should be completed every shift.  The Outcome Evaluation is a brief statement about your assessment that the patient is improving, declining, or no change.  This information will be displayed automatically on your shift  note.  Outcome: Adequate for Care Transition  Flowsheets (Taken 3/14/2025 1435)  Outcome Evaluation: Discharging home. A&Ox4. VSS. Denies pain, nausea or SOB  Plan of Care Reviewed With: patient  Overall Patient Progress: improving  Goal: Patient-Specific Goal (Individualized)  Description: You can add care plan individualizations to a care plan. Examples of Individualization might be:  \"Parent requests to be called daily at 9am for status\", \"I have a hard time hearing out of my right ear\", or \"Do not touch me to wake me up as it startles  me\".  Outcome: Adequate for Care Transition  Goal: Absence of Hospital-Acquired Illness or Injury  Outcome: Adequate for Care Transition  Intervention: Prevent Skin Injury  Recent Flowsheet Documentation  Taken 3/14/2025 0852 by May Bryant RN  Body Position: position changed independently  Intervention: Prevent and Manage VTE (Venous Thromboembolism) Risk  Recent Flowsheet Documentation  Taken 3/14/2025 0852 by May Bryant, RN  VTE Prevention/Management: SCDs off (sequential compression devices)  Goal: Optimal Comfort and Wellbeing  Outcome: Adequate for Care Transition  Goal: Readiness for Transition of Care  Outcome: Adequate for Care Transition     "

## 2025-03-14 NOTE — PLAN OF CARE
"/57  Pulse 63   Temp 98.3  F  Resp 16  SpO2 98%     Patient is alert and oriented x4, no complaints of pain, abdominal discomfort, nausea or vomiting, indepedent, continent of bowel and bladder, slept well at night. Plan is to discharge home today,       Goal Outcome Evaluation:      Plan of Care Reviewed With: patient    Overall Patient Progress: improvingOverall Patient Progress: improving    Outcome Evaluation: Patient is alert and oriented x4, no complaints of pain, abdominal discomfort, nausea or vomiting, indepedent, continent of bowel and bladder, slept well at night.      Problem: Adult Inpatient Plan of Care  Goal: Plan of Care Review  Description: The Plan of Care Review/Shift note should be completed every shift.  The Outcome Evaluation is a brief statement about your assessment that the patient is improving, declining, or no change.  This information will be displayed automatically on your shift  note.  Outcome: Progressing  Flowsheets (Taken 3/14/2025 0620)  Outcome Evaluation: Patient is alert and oriented x4, no complaints of pain, abdominal discomfort, nausea or vomiting, indepedent, continent of bowel and bladder, slept well at night.  Plan of Care Reviewed With: patient  Overall Patient Progress: improving  Goal: Patient-Specific Goal (Individualized)  Description: You can add care plan individualizations to a care plan. Examples of Individualization might be:  \"Parent requests to be called daily at 9am for status\", \"I have a hard time hearing out of my right ear\", or \"Do not touch me to wake me up as it startles  me\".  Outcome: Progressing  Goal: Absence of Hospital-Acquired Illness or Injury  Outcome: Progressing  Intervention: Identify and Manage Fall Risk  Recent Flowsheet Documentation  Taken 3/13/2025 2029 by Diana Han, RN  Safety Promotion/Fall Prevention:   clutter free environment maintained   lighting adjusted   nonskid shoes/slippers when out of bed   patient and family " education   room near nurse's station   room organization consistent   safety round/check completed  Intervention: Prevent Skin Injury  Recent Flowsheet Documentation  Taken 3/13/2025 2029 by Diana Han RN  Body Position: position changed independently  Intervention: Prevent and Manage VTE (Venous Thromboembolism) Risk  Recent Flowsheet Documentation  Taken 3/13/2025 2029 by Diana Han RN  VTE Prevention/Management:   SCDs off (sequential compression devices)   patient refused intervention  Intervention: Prevent Infection  Recent Flowsheet Documentation  Taken 3/13/2025 2029 by Diana Han RN  Infection Prevention:   cohorting utilized   hand hygiene promoted   rest/sleep promoted   single patient room provided  Goal: Optimal Comfort and Wellbeing  Outcome: Progressing  Goal: Readiness for Transition of Care  Outcome: Progressing

## 2025-03-14 NOTE — PROGRESS NOTES
GASTROENTEROLOGY PROGRESS NOTE     INTERVAL HISTORY   Tolerating regular diet. No abdominal pain/n/v. Has a small area of numbness near the base of the left second digit after a lab draw and wonders if a nerve was hit. Wondering about work restrictions (can lift up to 15kg at work)    OBJECTIVE:  General Appearance:  Male pt resting in hospital bed, NAD  /65 (BP Location: Right arm)   Pulse 82   Temp 98.2  F (36.8  C) (Oral)   Resp 16   Ht 1.829 m (6')   Wt 71.7 kg (158 lb 1.1 oz)   SpO2 100%   BMI 21.44 kg/m           PHYSICAL EXAM     Constitutional: alert and in no distress   Respiratory: lung sounds clear anteriorly   Abdomen: Soft, non-tender, + bowel sounds         Additional Comments:  ROS, FH, SH: See initial GI consult for details.     I have reviewed the patient's new clinical lab results:     Recent Labs   Lab Test 03/14/25  0655 03/13/25  1319 03/13/25  0636 03/11/25  1826 03/11/25  0642 03/10/25  1424 03/10/25  0734 03/08/25  1722 03/08/25  1400   WBC 2.9*  --   --   --  3.1*  --  5.5   < > 8.9   HGB 8.7* 9.0* 7.6*   < > 6.8*   < > 7.7*  7.7*   < > 10.3*   MCV 90  --   --   --  91  --  91   < > 90   *  --   --   --  70*  --  85*   < > 151   INR  --   --   --   --   --   --   --   --  1.26*    < > = values in this interval not displayed.     Recent Labs   Lab Test 03/14/25  0655 03/11/25  0642 03/10/25  0734   POTASSIUM 3.8 4.0 4.4   CHLORIDE 107 112* 110*   CO2 26 26 26   BUN 14.1 21.5* 28.2*   ANIONGAP 7 5* 6*     Recent Labs   Lab Test 03/11/25  0642 03/10/25  0734 03/08/25  1400   ALBUMIN 3.4* 3.2* 4.0   BILITOTAL 0.5 0.7 1.0   ALT 25 30 35   AST 26 27 32      IMAGING     CTA ABDOMEN PELVIS w/IV contrast 3/8/25  IMPRESSION:  1.  No evidence of active gastrointestinal bleeding.  2.  Cirrhotic liver with features of portal hypertension including splenomegaly, a small amount of ascites, and varices including extensive gastroesophageal varices.  3.  Mild wall thickening of the  ascending and proximal transverse colon is favored to reflect portal colopathy, although a mild colitis would appear similar.  4.  Innumerable benign cysts throughout both kidneys, which do not require follow-up.  5.  Bilateral nonobstructing intrarenal calculi measuring up to 4 mm on both sides.  6.  Fluid distends both seminal vesicles, likely related to the recent transurethral resection of the ejaculatory ducts. No abscess.     US ABDOMEN 3/8/2025  IMPRESSION:  1.  Morphologic changes of cirrhosis. Trace ascites.  2.  Normal Doppler evaluation of the hepatic vasculature.  3.  Mild gallbladder wall thickening, nonspecific and may be related to cirrhosis. No cholelithiasis or biliary dilatation.    ENDOSCOPIC EVALUATION:  EGD 3/9/2025 (Dr. Almeida)  Findings:       Large (> 5 mm) varices with stigmata of recent bleeding were found in        the lower third of the esophagus. Red cristina signs were present. Six bands        were successfully placed with incomplete eradication of varices. There        was no bleeding at the end of the procedure.        Type 1 gastroesophageal varices (GOV1, esophageal varices which extend        along the lesser curvature) with no bleeding were found in the cardia.        There were no stigmata of recent bleeding. They were medium in largest        diameter.        Moderate portal hypertensive gastropathy was found in the entire        examined stomach.        The duodenal bulb, first portion of the duodenum and second portion of        the duodenum were normal.                                                                                    Impression:         - Large (> 5 mm) esophageal varices with stigmata                             of recent bleeding and extensive red cristina signs.                             Incompletely eradicated. Banded.                             - Type 1 gastroesophageal varices (GOV1, esophageal                             varices which extend along the  lesser curvature),                             without bleeding.                             - Portal hypertensive gastropathy.                             - Normal duodenal bulb, first portion of the                             duodenum and second portion of the duodenum.                             - No specimens collected.       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 CT with cirrhotic appearing liver with features of portal HTN (splenomegaly, small amount ascites, varices).     Etiology of cirrhosis unclear. Denies alcohol use. No family history of liver disease. Reports well water exposure - elevated levels of arsenic. His half sister has cirrhosis.     HCV antibody - non reactive   HBV S Ab, HBV S Ag, HBV C Ab - non reactive, shows immunity due to vaccine  Mitochondrial M2 Antibody IgG - Negative  MARK - Negative  F Actin EIA with reflex - Negative  Ceruloplasmin - normal  Iron panel - low iron and iron binding capacity  Ferritin - normal  Celiac - negative   Alpha-1-antitrypsin - pending  Schistosoma - pending     EGD 3/9 with large varices- incompletely eradicated and portal hypertensive gastropathy    3/11 Drop in hgb to 6.8 No overt bleeding- transfused 1 U PRBC.   Hgb fluctuating 7-9 range, stable.   Started on carvedilol 3/13 PM      PLAN  2gm NA diet   PPI BID   Alpha 1 antitrypsin/Schistosoma pending   Discussed with Dr. Polk - No set work restrictions from our perspective after EGD/variceal banding, but he may need time off due to anemia/deconditioning (1 week/give or take depending on how he is feeling)  Repeat EGD 1 month. We will call to schedule   Hepatology follow up on discharge. He would like to see Dr. Patton at [facility], if able (who is sister follows with)      Su Vasquez CNP  [facility]  Cell: [phone]  Office: [phone]

## 2025-03-14 NOTE — PROGRESS NOTES
Patient's After Visit Summary was reviewed with patient.   Patient verbalized understanding of After Visit Summary, recommended follow up and was given an opportunity to ask questions.   Discharge medications sent home with patient/family: YES   Discharged with other    Pt Discharged with all of his belongings, AVS and discharge medications       02-Nov-2018 13:45

## 2025-03-14 NOTE — DISCHARGE SUMMARY
[facility]  Hospitalist Discharge Summary      Date of Admission:  3/8/2025  Date of Discharge:  3/14/2025  Discharging Provider: Guanako Dalal MD  Discharge Service: Hospitalist Service    Discharge Diagnoses     Upper GI bleed  Gastroesophageal varices s/p banding  Hematemesis  Acute blood loss anemia  Cirrhosis of unclear cause, new diagnosis  Polycystic kidney disease, new diagnosis    Clinically Significant Risk Factors     # Moderate Malnutrition: based on nutrition assessment and treatment provided per dietitian's recommendations.      Follow-ups Needed After Discharge   Follow-up Appointments       Follow-up and recommended labs and tests       Establish primary care with [facility] in one week. Check a CMP and CBC with platelets at that time.     [facility] will contact you to schedule EGD in 6 weeks and hepatology follow up. If you have not heard from them by mid next week you can schedule on line or call [phone] to schedule.    Come to [facility] outpatient lab on first floor next week to get kit for hair arsenic test. Call [phone] to ensure kit has arrived before coming. I have been coordinating with Pina in the lab.    See return to work note for 3/31/25.            Follow up with Dr. Gray of [facility] nephrology next available.     Unresulted Labs Ordered in the Past 30 Days of this Admission       Date and Time Order Name Status Description    3/10/2025  5:07 PM Schistosoma Antibody IgG In process     3/10/2025  3:37 PM Alpha 1 Antitrypsin In process         These results will be followed up by [facility]    Discharge Disposition   Discharged to home  Condition at discharge: Stable    Hospital Course   Willi Lares is a 30 year old male admitted on 3/8/2025. He has medical history of azoospermia and family history of polycystic kidney disease.  He presented to the ED with complaints of vomiting blood and also having dark watery bowel movements.  ED evaluation  showed stable vital signs. Laboratory evaluation showed HGB 10.3. CT showed new cirrhosis, polycystic kidneys, and evidence of varices.  Hemoglobin dipped to 7.5 early in admission for which 1 unit of PRBCs was transfused.  GI consulted and he had EGD with banding on 3/9 with plan for repeat EGD prior to discharge.  He was also noted to have innumerable benign cysts in both kidneys worrisome for PCKD. This would also be a new diagnosis for patient, although less surprising with his family history. Nephrology was consulted for initial assessment of this new diagnosis. Hemoglobin drifted down to 6.8 on 3/11 for which a second unit of RBCs was transfused.  Later in stay patient offered that his family home in which she has lived for most of his life uses well water that has tested higher than normal for arsenic.  He inquired about this as a potential cause for his liver disease, which is an interesting idea.     Problem list:      Upper GI bleed  Gastroesophageal varices s/p banding  Hematemesis  Acute blood loss anemia  -S/p EGD 3/9 with large varices, multiple banded but will need repeat EGD for additional management   -Some ongoing HGB decline; possibly due to oozing from banded and deteriorating vessels   -S/p transfusion of 1 unit of RBCs on 3/9 for HGB 7.5  -S/p transfusion of 1  unit of RBC's on 3/11 for HGB 6.8  -HGB has stabilized at near 9  -Tolerating diet  -Tolerating low dosed Coreg (started in PM on 3/13)  -Discharge to home  -Follow up with [facility] for EGD in 4-6 weeks  -Follow up with [facility] Hepatology     Cirrhosis  -New diagnosis.  CT and ultrasound imaging consistent with cirrhosis.  No clear risk factors, denies heavy alcohol use.  Does have polycystic kidney disease in the family, but no visible cysts on liver on imaging.  Viral and autoimmune workup per GI.  -Patient raises the question of possible arsenic toxicity causing his liver disease.  This is interesting and is definitely possible.  He has  been in the hospital too long for a urine or blood test to be helpful.  He has lived at his family home for the last year so a hair sample might be helpful.  I spoke with lab and they are going to obtain a kit for a hair arsenic test.  The kit will likely be here next week.  Patient will come in and collect the kit and bring it back with a hair sample.  The kit will have instructions.  Patient can call [phone] to ensure get has arrived before he comes in.  I have been discussing this with Pina from lab who has been quite helpful. I ordered [facility] arsenic, hair as outpatient lab.  -Continue Coreg on discharge  -Follow up with [facility] for EGD in 6 weeks and with [facility] Hepatology next available  -Some of the autoimmune work up is pending  -As discussed above, hair arsenic test is being ordered as outpatient  -Will likely need a liver biopsy at some point.      Polycystic kidney disease  -Family history of polycystic kidney disease  -Patient was not previously diagnosed with PCKD  -Nephrology consult appreciated; he will follow-up with [facility] as outpatient    Consultations This Hospital Stay   GASTROENTEROLOGY IP CONSULT  NEPHROLOGY IP CONSULT    Code Status   Full Code    Time Spent on this Encounter   I, Guanako Dalal MD, personally saw the patient today and spent greater than 30 minutes discharging this patient.       Guanako Dalal MD  [facility]  [address] [facility] [address]  Phone: [phone]  Fax: [phone]  ______________________________________________________________________    Physical Exam   Vital Signs: Temp: 98.2  F (36.8  C) Temp src: Oral BP: 118/65 Pulse: 82   Resp: 16 SpO2: 100 % O2 Device: None (Room air)    Weight: 158 lbs 1.12 oz  GENERAL:  Comfortable. Cooperative.  PSYCH: pleasant, oriented, No acute distress.  EYES: PERRLA, Normal conjunctiva.  HEART:  Regular rate and rhythm. No JVD. Pulses normal. No edema.  LUNGS:  Clear to  auscultation, normal Respiratory effort.  ABDOMEN:  Soft, no hepatosplenomegaly, normal bowel sounds.  EXTREMETIES: No clubbing, cyanosis or ischemia  SKIN:  Dry to touch, No rash.         Primary Care Physician   Sandeep Hernandez    Discharge Orders      Other Laboratory; [facility] ouit[facility] lab; arsenic, hair (Laboratory Miscellaneous Order)     Internal Medicine Referral      Reason for your hospital stay    Upper GI bleed and acute blood loss anemia due to new cirrhosis of unclear cause with esophageal varices.     Follow-up and recommended labs and tests     Establish primary care with [facility] in one week. Check a CMP and CBC with platelets at that time.     [facility] will contact you to schedule EGD in 6 weeks and hepatology follow up. If you have not heard from them by mid next week you can schedule on line or call [phone] to schedule.    Come to [facility] outpatient lab on first floor next week to get kit for hair arsenic test. Call [phone] to ensure kit has arrived before coming. I have been coordinating with Pnia in the lab.    See return to work note for 3/31/25.     Activity    Your activity upon discharge: activity as tolerated     Discharge Instructions    Seek urgent medical attention for recurrent GI blood loss, lightheadedness, or dizziness.     Diet    Follow this diet upon discharge: Current Diet:Orders Placed This Encounter      Snacks/Supplements Adult: Ensure Enlive; With Meals      2 Gram Sodium Diet       Significant Results and Procedures   Most Recent 3 CBC's:  Recent Labs   Lab Test 03/14/25  0655 03/13/25  1319 03/13/25  0636 03/11/25  1826 03/11/25  0642 03/10/25  1424 03/10/25  0734   WBC 2.9*  --   --   --  3.1*  --  5.5   HGB 8.7* 9.0* 7.6*   < > 6.8*   < > 7.7*  7.7*   MCV 90  --   --   --  91  --  91   *  --   --   --  70*  --  85*    < > = values in this interval not displayed.     Most Recent 3 BMP's:  Recent Labs   Lab Test 03/14/25  0655  03/11/25  0642 03/10/25  0734    143 142   POTASSIUM 3.8 4.0 4.4   CHLORIDE 107 112* 110*   CO2 26 26 26   BUN 14.1 21.5* 28.2*   CR 0.83 0.83 0.83   ANIONGAP 7 5* 6*   LIN 8.5* 7.6* 7.9*   GLC 99 101* 121*     Most Recent 2 LFT's:  Recent Labs   Lab Test 03/11/25  0642 03/10/25  0734   AST 26 27   ALT 25 30   ALKPHOS 32* 33*   BILITOTAL 0.5 0.7     Most Recent 3 INR's:  Recent Labs   Lab Test 03/08/25  1400   INR 1.26*     7-Day Micro Results       Collected Updated Procedure Result Status      03/10/2025 2110 03/10/2025 2123 Schistosoma Antibody IgG [[id number]]   Blood from Hand, Right    In process Component Value   No component results                ,   Results for orders placed or performed during the hospital encounter of 03/08/25   CTA Abdomen Pelvis with Contrast    Narrative    EXAM: CTA ABDOMEN PELVIS WITH CONTRAST  LOCATION: [facility]  DATE: 3/8/2025    INDICATION: Hematemesis. Melena.  COMPARISON: MRI pelvis 8/9/2024.  TECHNIQUE: CT angiogram abdomen pelvis during arterial phase of injection of IV contrast. 2D and 3D MIP reconstructions were performed by the CT technologist. Dose reduction techniques were used.  CONTRAST: 83mL isovue 370    FINDINGS:    ANGIOGRAM ABDOMEN/PELVIS: No evidence of active gastrointestinal bleeding. No abdominal aortic aneurysm. Celiac artery and its branches are patent. Superior mesenteric artery and its visualized branches are patent. Bilateral single renal arteries are   patent. Inferior mesenteric artery and its visualized branches are patent. Bilateral iliofemoral veins are patent. Portal, splenic, and superior mesenteric veins are patent. Extensive gastroesophageal varices. Multiple left upper quadrant varices   including a small splenorenal shunt. IVC is patent.    LOWER CHEST: Normal.    HEPATOBILIARY: Cirrhotic liver morphology. Tiny right hepatic cyst. No suspicious liver lesions. No calcified gallstones or biliary ductal  dilation.    PANCREAS: Normal.    SPLEEN: Mild splenomegaly measuring 13.5 cm.    ADRENAL GLANDS: Normal.    KIDNEYS/BLADDER: Innumerable benign cysts throughout both kidneys measuring up to 6.9 cm on the right and 6.3 cm on the left. Single nonobstructing 4 mm right lower pole renal calculus. Three nonobstructing left renal calculi measuring up to 4 mm. No   hydronephrosis. No ureteral calculi. Normal bladder.    BOWEL: No bowel obstruction or small bowel inflammation. Normal appendix. Mild wall thickening of the ascending and proximal transverse colon.    PERITONEUM/RETROPERITONEUM: Small amount of ascites. Mesenteric edema.    LYMPH NODES: No enlarged lymph nodes.    PELVIC ORGANS: Fluid distends both seminal vesicles. 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     MUSCULOSKELETAL: No acute bony abnormality or destructive bone lesions.      Impression    IMPRESSION:    1.  No evidence of active gastrointestinal bleeding.    2.  Cirrhotic liver with features of portal hypertension including splenomegaly, a small amount of ascites, and varices including extensive gastroesophageal varices.    3.  Mild wall thickening of the ascending and proximal transverse colon is favored to reflect portal colopathy, although a mild colitis would appear similar.    4.  Innumerable benign cysts throughout both kidneys, which do not require follow-up.    5.  Bilateral nonobstructing intrarenal calculi measuring up to 4 mm on both sides.    6.  Fluid distends both seminal vesicles, likely related to the recent transurethral resection of the ejaculatory ducts. No abscess.   US Abdomen Limited w Abd/Pelvis Duplex Complete    Narrative    EXAM: US ABDOMEN LIMITED WITH DOPPLER COMPLETE  LOCATION: [facility]  DATE: 3/8/2025    INDICATION: Liver cirrhosis  COMPARISON: None.  TECHNIQUE: Limited abdominal ultrasound. Color flow with spectral Doppler and waveform analysis performed.     FINDINGS:    GALLBLADDER:  Borderline gallbladder wall thickening. No pericholecystic fluid. No cholelithiasis or bile sludge. Negative sonographic Hicks sign.     BILE DUCTS: No biliary dilatation. The common duct measures 5 mm.    LIVER: Nodular hepatic surface contour. Coarsened hepatic echotexture. No focal mass.     RIGHT KIDNEY: Measures 14.5 cm in length.. Innumerable simple cysts in the kidney, measuring up to 7.9 cm; no follow-up indicated. No hydronephrosis or mass.     PANCREAS: The visualized portions are normal.    AORTA: Normal in caliber.    IVC: Normal where visualized.    Trace ascites.    ABDOMINAL DUPLEX: The hepatic artery, hepatic veins, IVC, portal veins, and splenic vein are patent with flow in the normal direction.       Impression    IMPRESSION:  1.  Morphologic changes of cirrhosis. Trace ascites.  2.  Normal Doppler evaluation of the hepatic vasculature.  3.  Mild gallbladder wall thickening, nonspecific and may be related to cirrhosis. No cholelithiasis or biliary dilatation.       Discharge Medications   Current Discharge Medication List        START taking these medications    Details   carvedilol (COREG) 3.125 MG tablet Take 1 tablet (3.125 mg) by mouth 2 times daily (with meals).  Qty: 60 tablet, Refills: 1    Associated Diagnoses: Cirrhosis of liver with ascites, unspecified hepatic cirrhosis type (H)      pantoprazole (PROTONIX) 40 MG EC tablet Take 1 tablet (40 mg) by mouth every morning (before breakfast).  Qty: 30 tablet, Refills: 1    Associated Diagnoses: Upper GI bleed           Allergies   No Known Allergies

## 2025-03-15 LAB — SCHISTOSOMA IGG SER IA-ACNC: 6 U

## (undated) DEVICE — GLOVE BIOGEL PI MICRO SZ 7.5 48575

## (undated) DEVICE — PACK CYSTO CUSTOM ASC

## (undated) DEVICE — SYR 20ML LL W/O NDL 302830

## (undated) DEVICE — PAD CHUX UNDERPAD 30X36" P3036C

## (undated) DEVICE — Device

## (undated) DEVICE — ENDO LIGATOR UNIV 6 BAND G31917 MBL-U-6

## (undated) DEVICE — SOLUTION IV WATER 1000ML R5000-01

## (undated) DEVICE — GOWN XLG DISP 9545

## (undated) DEVICE — PROBE COVER INTRAOPERATIVE 5"X96" PC1308

## (undated) DEVICE — SOL WATER IRRIG 3000ML BAG 2B7117

## (undated) DEVICE — SYR 50ML CATH TIP W/O NDL 309620

## (undated) DEVICE — SYR 10ML LL W/O NDL

## (undated) DEVICE — DRAPE STERI TOWEL LG 1010

## (undated) DEVICE — NDL 18GA 1.5" 305196

## (undated) DEVICE — LINEN TOWEL PACK X5 5464

## (undated) DEVICE — TUBING SUCTION MEDI-VAC SOFT 3/16"X20' N520A

## (undated) DEVICE — SUCTION MANIFOLD NEPTUNE 2 SYS 4 PORT 0702-020-000

## (undated) DEVICE — NDL SPINAL 22GA 7" QUINCKE 405149

## (undated) DEVICE — NDL BIOPSY 18GA 20CM 441820

## (undated) DEVICE — ESU GROUND PAD ADULT W/CORD E7507

## (undated) DEVICE — SOL WATER IRRIG 500ML BOTTLE 2F7113

## (undated) RX ORDER — FENTANYL CITRATE-0.9 % NACL/PF 10 MCG/ML
PLASTIC BAG, INJECTION (ML) INTRAVENOUS
Status: DISPENSED
Start: 2025-03-09

## (undated) RX ORDER — CEFAZOLIN SODIUM 2 G/50ML
SOLUTION INTRAVENOUS
Status: DISPENSED
Start: 2024-11-15

## (undated) RX ORDER — KETOROLAC TROMETHAMINE 30 MG/ML
INJECTION, SOLUTION INTRAMUSCULAR; INTRAVENOUS
Status: DISPENSED
Start: 2024-11-15

## (undated) RX ORDER — DEXAMETHASONE SODIUM PHOSPHATE 4 MG/ML
INJECTION, SOLUTION INTRA-ARTICULAR; INTRALESIONAL; INTRAMUSCULAR; INTRAVENOUS; SOFT TISSUE
Status: DISPENSED
Start: 2024-11-15

## (undated) RX ORDER — ACETAMINOPHEN 325 MG/1
TABLET ORAL
Status: DISPENSED
Start: 2024-11-15

## (undated) RX ORDER — ONDANSETRON 2 MG/ML
INJECTION INTRAMUSCULAR; INTRAVENOUS
Status: DISPENSED
Start: 2024-11-15

## (undated) RX ORDER — PROPOFOL 10 MG/ML
INJECTION, EMULSION INTRAVENOUS
Status: DISPENSED
Start: 2024-11-15

## (undated) RX ORDER — FENTANYL CITRATE 50 UG/ML
INJECTION, SOLUTION INTRAMUSCULAR; INTRAVENOUS
Status: DISPENSED
Start: 2024-11-15

## (undated) RX ORDER — FENTANYL CITRATE 50 UG/ML
INJECTION, SOLUTION INTRAMUSCULAR; INTRAVENOUS
Status: DISPENSED
Start: 2025-03-09